# Patient Record
Sex: FEMALE | Race: WHITE | Employment: UNEMPLOYED | URBAN - METROPOLITAN AREA
[De-identification: names, ages, dates, MRNs, and addresses within clinical notes are randomized per-mention and may not be internally consistent; named-entity substitution may affect disease eponyms.]

---

## 2021-03-17 ENCOUNTER — HOSPITAL ENCOUNTER (EMERGENCY)
Age: 51
Discharge: HOME | End: 2021-03-17
Attending: EMERGENCY MEDICINE

## 2021-03-17 VITALS
DIASTOLIC BLOOD PRESSURE: 89 MMHG | TEMPERATURE: 99 F | SYSTOLIC BLOOD PRESSURE: 178 MMHG | BODY MASS INDEX: 31.58 KG/M2 | WEIGHT: 185 LBS | RESPIRATION RATE: 20 BRPM | HEIGHT: 64 IN | OXYGEN SATURATION: 99 % | HEART RATE: 95 BPM

## 2021-03-17 DIAGNOSIS — T78.3XXA ANGIOEDEMA, INITIAL ENCOUNTER: ICD-10-CM

## 2021-03-17 DIAGNOSIS — L50.9 URTICARIA: Primary | ICD-10-CM

## 2021-03-17 PROCEDURE — 96374 THER/PROPH/DIAG INJ IV PUSH: CPT

## 2021-03-17 PROCEDURE — 99284 EMERGENCY DEPT VISIT MOD MDM: CPT

## 2021-03-17 PROCEDURE — S0028 INJECTION, FAMOTIDINE, 20 MG: HCPCS | Performed by: PHYSICIAN ASSISTANT

## 2021-03-17 PROCEDURE — 96375 TX/PRO/DX INJ NEW DRUG ADDON: CPT

## 2021-03-17 RX ORDER — DIPHENHYDRAMINE HYDROCHLORIDE 50 MG/ML
50 INJECTION INTRAMUSCULAR; INTRAVENOUS ONCE
Status: COMPLETED | OUTPATIENT
Start: 2021-03-17 | End: 2021-03-17

## 2021-03-17 RX ORDER — PREDNISONE 20 MG/1
40 TABLET ORAL DAILY
Qty: 10 TABLET | Refills: 0 | Status: SHIPPED | OUTPATIENT
Start: 2021-03-17 | End: 2021-03-22

## 2021-03-17 RX ORDER — METHYLPREDNISOLONE SODIUM SUCCINATE 125 MG/2ML
125 INJECTION, POWDER, LYOPHILIZED, FOR SOLUTION INTRAMUSCULAR; INTRAVENOUS ONCE
Status: COMPLETED | OUTPATIENT
Start: 2021-03-17 | End: 2021-03-17

## 2021-03-17 RX ORDER — FAMOTIDINE 10 MG/ML
20 INJECTION, SOLUTION INTRAVENOUS ONCE
Status: COMPLETED | OUTPATIENT
Start: 2021-03-17 | End: 2021-03-17

## 2021-03-17 NOTE — ED PROVIDER NOTES
Patient Seen in: THE [facility] Emergency Department In [place]      History   Patient presents with:   Allergic Rxn Allergies    Stated Complaint: allergic rx chronic/cough    HPI/Subjective:   HPI    Raleigh Gabrielle is a 79-year-old female who presents today for evalua 99 °F (37.2 °C)   Temp src Temporal   SpO2 99 %   O2 Device None (Room air)       Current:BP (!) 178/89   Pulse 95   Temp 99 °F (37.2 °C) (Temporal)   Resp 20   Ht 162.6 cm (5' 4\")   Wt 83.9 kg   LMP 03/10/2021   SpO2 99%   BMI 31.76 kg/m²         [facility] plan of care. I explained to the patient that unfortunately, the emergency room is not able to work this kind of longstanding issue up thoroughly. She needs to see an allergist/immunologist, but is currently uninsured.   We recommend she contact the n

## 2023-08-28 ENCOUNTER — OFFICE VISIT (OUTPATIENT)
Dept: FAMILY MEDICINE CLINIC | Facility: CLINIC | Age: 53
End: 2023-08-28
Payer: COMMERCIAL

## 2023-08-28 VITALS
BODY MASS INDEX: 33.8 KG/M2 | HEIGHT: 64.25 IN | OXYGEN SATURATION: 98 % | HEART RATE: 89 BPM | RESPIRATION RATE: 16 BRPM | DIASTOLIC BLOOD PRESSURE: 80 MMHG | WEIGHT: 198 LBS | SYSTOLIC BLOOD PRESSURE: 108 MMHG

## 2023-08-28 DIAGNOSIS — Z12.83 SKIN CANCER SCREENING: ICD-10-CM

## 2023-08-28 DIAGNOSIS — Z13.21 ENCOUNTER FOR VITAMIN DEFICIENCY SCREENING: ICD-10-CM

## 2023-08-28 DIAGNOSIS — T78.40XA ALLERGY, INITIAL ENCOUNTER: Primary | ICD-10-CM

## 2023-08-28 DIAGNOSIS — Z12.31 ENCOUNTER FOR SCREENING MAMMOGRAM FOR BREAST CANCER: ICD-10-CM

## 2023-08-28 DIAGNOSIS — Z00.00 LABORATORY EXAMINATION ORDERED AS PART OF A ROUTINE GENERAL MEDICAL EXAMINATION: ICD-10-CM

## 2023-08-28 DIAGNOSIS — N92.6 IRREGULAR PERIODS: ICD-10-CM

## 2023-08-28 DIAGNOSIS — Z12.4 CERVICAL CANCER SCREENING: ICD-10-CM

## 2023-08-28 DIAGNOSIS — Z12.11 SCREENING FOR COLON CANCER: ICD-10-CM

## 2023-08-28 RX ORDER — OMALIZUMAB 150 MG/ML
300 INJECTION, SOLUTION SUBCUTANEOUS ONCE
COMMUNITY
Start: 2021-06-15

## 2023-08-28 RX ORDER — LORATADINE 10 MG/1
20 TABLET ORAL DAILY
COMMUNITY

## 2023-08-28 NOTE — PATIENT INSTRUCTIONS
Complete blood work   Follow up with allergist  Follow up with surgeon for colon cancer screening   Follow up with dermatologist   Follow up with gynecologist.   Complete mammogram.

## 2023-09-02 ENCOUNTER — LAB ENCOUNTER (OUTPATIENT)
Dept: LAB | Age: 53
End: 2023-09-02
Attending: FAMILY MEDICINE
Payer: COMMERCIAL

## 2023-09-02 DIAGNOSIS — Z00.00 LABORATORY EXAMINATION ORDERED AS PART OF A ROUTINE GENERAL MEDICAL EXAMINATION: ICD-10-CM

## 2023-09-02 DIAGNOSIS — N92.6 IRREGULAR PERIODS: ICD-10-CM

## 2023-09-02 DIAGNOSIS — Z13.21 ENCOUNTER FOR VITAMIN DEFICIENCY SCREENING: ICD-10-CM

## 2023-09-02 LAB
ALBUMIN SERPL-MCNC: 3.7 G/DL (ref 3.4–5)
ALBUMIN/GLOB SERPL: 1.2 {RATIO} (ref 1–2)
ALP LIVER SERPL-CCNC: 72 U/L
ALT SERPL-CCNC: 47 U/L
ANION GAP SERPL CALC-SCNC: 4 MMOL/L (ref 0–18)
AST SERPL-CCNC: 24 U/L (ref 15–37)
BASOPHILS # BLD AUTO: 0.04 X10(3) UL (ref 0–0.2)
BASOPHILS NFR BLD AUTO: 0.6 %
BILIRUB SERPL-MCNC: 0.5 MG/DL (ref 0.1–2)
BUN BLD-MCNC: 10 MG/DL (ref 7–18)
CALCIUM BLD-MCNC: 9.4 MG/DL (ref 8.5–10.1)
CHLORIDE SERPL-SCNC: 108 MMOL/L (ref 98–112)
CHOLEST SERPL-MCNC: 161 MG/DL (ref ?–200)
CO2 SERPL-SCNC: 28 MMOL/L (ref 21–32)
CREAT BLD-MCNC: 0.83 MG/DL
EGFRCR SERPLBLD CKD-EPI 2021: 84 ML/MIN/1.73M2 (ref 60–?)
EOSINOPHIL # BLD AUTO: 0.12 X10(3) UL (ref 0–0.7)
EOSINOPHIL NFR BLD AUTO: 1.7 %
ERYTHROCYTE [DISTWIDTH] IN BLOOD BY AUTOMATED COUNT: 12.3 %
FASTING PATIENT LIPID ANSWER: YES
FASTING STATUS PATIENT QL REPORTED: YES
FSH SERPL-ACNC: 38.9 MIU/ML
GLOBULIN PLAS-MCNC: 3 G/DL (ref 2.8–4.4)
GLUCOSE BLD-MCNC: 111 MG/DL (ref 70–99)
HCT VFR BLD AUTO: 40.3 %
HDLC SERPL-MCNC: 50 MG/DL (ref 40–59)
HGB BLD-MCNC: 13.6 G/DL
IMM GRANULOCYTES # BLD AUTO: 0.01 X10(3) UL (ref 0–1)
IMM GRANULOCYTES NFR BLD: 0.1 %
LDLC SERPL CALC-MCNC: 93 MG/DL (ref ?–100)
LH SERPL-ACNC: 29.9 MIU/ML
LYMPHOCYTES # BLD AUTO: 2.39 X10(3) UL (ref 1–4)
LYMPHOCYTES NFR BLD AUTO: 34.2 %
MCH RBC QN AUTO: 30.6 PG (ref 26–34)
MCHC RBC AUTO-ENTMCNC: 33.7 G/DL (ref 31–37)
MCV RBC AUTO: 90.8 FL
MONOCYTES # BLD AUTO: 0.41 X10(3) UL (ref 0.1–1)
MONOCYTES NFR BLD AUTO: 5.9 %
NEUTROPHILS # BLD AUTO: 4.02 X10 (3) UL (ref 1.5–7.7)
NEUTROPHILS # BLD AUTO: 4.02 X10(3) UL (ref 1.5–7.7)
NEUTROPHILS NFR BLD AUTO: 57.5 %
NONHDLC SERPL-MCNC: 111 MG/DL (ref ?–130)
OSMOLALITY SERPL CALC.SUM OF ELEC: 290 MOSM/KG (ref 275–295)
PLATELET # BLD AUTO: 292 10(3)UL (ref 150–450)
POTASSIUM SERPL-SCNC: 3.9 MMOL/L (ref 3.5–5.1)
PROT SERPL-MCNC: 6.7 G/DL (ref 6.4–8.2)
RBC # BLD AUTO: 4.44 X10(6)UL
SODIUM SERPL-SCNC: 140 MMOL/L (ref 136–145)
TRIGL SERPL-MCNC: 95 MG/DL (ref 30–149)
TSI SER-ACNC: 2.88 MIU/ML (ref 0.36–3.74)
VIT B12 SERPL-MCNC: 555 PG/ML (ref 193–986)
VIT D+METAB SERPL-MCNC: 46 NG/ML (ref 30–100)
VLDLC SERPL CALC-MCNC: 15 MG/DL (ref 0–30)
WBC # BLD AUTO: 7 X10(3) UL (ref 4–11)

## 2023-09-02 PROCEDURE — 80053 COMPREHEN METABOLIC PANEL: CPT

## 2023-09-02 PROCEDURE — 82607 VITAMIN B-12: CPT

## 2023-09-02 PROCEDURE — 80061 LIPID PANEL: CPT

## 2023-09-02 PROCEDURE — 84443 ASSAY THYROID STIM HORMONE: CPT

## 2023-09-02 PROCEDURE — 36415 COLL VENOUS BLD VENIPUNCTURE: CPT

## 2023-09-02 PROCEDURE — 82306 VITAMIN D 25 HYDROXY: CPT

## 2023-09-02 PROCEDURE — 85025 COMPLETE CBC W/AUTO DIFF WBC: CPT

## 2023-09-02 PROCEDURE — 83001 ASSAY OF GONADOTROPIN (FSH): CPT

## 2023-09-02 PROCEDURE — 83002 ASSAY OF GONADOTROPIN (LH): CPT

## 2023-09-08 DIAGNOSIS — R73.01 ELEVATED FASTING GLUCOSE: Primary | ICD-10-CM

## 2023-09-11 ENCOUNTER — TELEPHONE (OUTPATIENT)
Dept: ADMINISTRATIVE | Age: 53
End: 2023-09-11

## 2023-09-11 DIAGNOSIS — T78.40XA ALLERGY, INITIAL ENCOUNTER: Primary | ICD-10-CM

## 2023-09-12 ENCOUNTER — TELEPHONE (OUTPATIENT)
Dept: ALLERGY | Facility: CLINIC | Age: 53
End: 2023-09-12

## 2023-09-12 ENCOUNTER — OFFICE VISIT (OUTPATIENT)
Facility: LOCATION | Age: 53
End: 2023-09-12
Payer: COMMERCIAL

## 2023-09-12 DIAGNOSIS — Z12.11 ENCOUNTER FOR SCREENING COLONOSCOPY: Primary | ICD-10-CM

## 2023-09-12 PROCEDURE — S0285 CNSLT BEFORE SCREEN COLONOSC: HCPCS | Performed by: SURGERY

## 2023-09-12 RX ORDER — POLYETHYLENE GLYCOL 3350, SODIUM CHLORIDE, SODIUM BICARBONATE, POTASSIUM CHLORIDE 420; 11.2; 5.72; 1.48 G/4L; G/4L; G/4L; G/4L
POWDER, FOR SOLUTION ORAL
Qty: 1 EACH | Refills: 0 | Status: SHIPPED | OUTPATIENT
Start: 2023-09-12

## 2023-09-14 ENCOUNTER — NURSE ONLY (OUTPATIENT)
Dept: ALLERGY | Facility: CLINIC | Age: 53
End: 2023-09-14

## 2023-09-14 ENCOUNTER — TELEPHONE (OUTPATIENT)
Dept: ALLERGY | Facility: CLINIC | Age: 53
End: 2023-09-14

## 2023-09-14 ENCOUNTER — OFFICE VISIT (OUTPATIENT)
Dept: ALLERGY | Facility: CLINIC | Age: 53
End: 2023-09-14
Payer: COMMERCIAL

## 2023-09-14 VITALS
OXYGEN SATURATION: 97 % | SYSTOLIC BLOOD PRESSURE: 133 MMHG | HEART RATE: 73 BPM | BODY MASS INDEX: 32.44 KG/M2 | WEIGHT: 190 LBS | HEIGHT: 64 IN | DIASTOLIC BLOOD PRESSURE: 91 MMHG

## 2023-09-14 DIAGNOSIS — L50.1 CHRONIC IDIOPATHIC URTICARIA: Primary | ICD-10-CM

## 2023-09-14 DIAGNOSIS — H10.10 SEASONAL AND PERENNIAL ALLERGIC RHINOCONJUNCTIVITIS: ICD-10-CM

## 2023-09-14 DIAGNOSIS — J30.2 SEASONAL AND PERENNIAL ALLERGIC RHINOCONJUNCTIVITIS: ICD-10-CM

## 2023-09-14 DIAGNOSIS — L50.1 IDIOPATHIC URTICARIA: Primary | ICD-10-CM

## 2023-09-14 DIAGNOSIS — J30.89 SEASONAL AND PERENNIAL ALLERGIC RHINOCONJUNCTIVITIS: ICD-10-CM

## 2023-09-14 DIAGNOSIS — Z28.21 COVID-19 VACCINE DOSE DECLINED: ICD-10-CM

## 2023-09-14 DIAGNOSIS — T78.3XXA ANGIOEDEMA, INITIAL ENCOUNTER: ICD-10-CM

## 2023-09-14 PROCEDURE — 3075F SYST BP GE 130 - 139MM HG: CPT | Performed by: ALLERGY & IMMUNOLOGY

## 2023-09-14 PROCEDURE — 3008F BODY MASS INDEX DOCD: CPT | Performed by: ALLERGY & IMMUNOLOGY

## 2023-09-14 PROCEDURE — 3080F DIAST BP >= 90 MM HG: CPT | Performed by: ALLERGY & IMMUNOLOGY

## 2023-09-14 PROCEDURE — 99244 OFF/OP CNSLTJ NEW/EST MOD 40: CPT | Performed by: ALLERGY & IMMUNOLOGY

## 2023-09-14 PROCEDURE — 96372 THER/PROPH/DIAG INJ SC/IM: CPT | Performed by: ALLERGY & IMMUNOLOGY

## 2023-09-14 RX ORDER — EPINEPHRINE 0.3 MG/.3ML
INJECTION SUBCUTANEOUS
Qty: 1 EACH | Refills: 0 | Status: SHIPPED | OUTPATIENT
Start: 2023-09-14

## 2023-09-14 NOTE — TELEPHONE ENCOUNTER
Patient during office visit completed patient portion of Xolair Prescriber Services Form and Patient Consent Form. RN completed clinical portion of form. Dr. Martha Law reviewed forms, completed physician portion of form and signed form. Completed Prescriber Service Form and Patient Consent faxed to [address] [place] [address] at [phone]. Fax confirmation received. Await response from [employer].

## 2023-09-20 NOTE — TELEPHONE ENCOUNTER
Xolair PA form printed from [employer]. Completed Xolair PA form. Completed form with copy of 9/14/2023 Allergy Physician Visit Note faxed to [facility] at [phone]. Await fax confirmation.

## 2023-09-20 NOTE — TELEPHONE ENCOUNTER
Xolair Access Solution form completed reporting that Xolair coverage will be initiated through pharmacy benefit- [employer], and that covered specialty pharmacy is [facility]. [employer] covered specialty pharmacy for Xolair [facility] per protocol. Completed form faxed to [address] [place], [place] [address] [facility] at [phone]. Fax confirmation received.

## 2023-09-20 NOTE — TELEPHONE ENCOUNTER
[employer] Benefit Investigation Results received via fax. Complete PA through . . . Pharmacy Benefit- [employer]    Call [phone] for PA and to inquire of covered specialty pharmacy for [address] is not required. Pre-Determination is Highly Recommended. Call for Juan Diego Grimm, [facility], [phone]. Covered specialty pharmacy is [address]

## 2023-09-21 RX ORDER — OMALIZUMAB 150 MG/ML
300 INJECTION, SOLUTION SUBCUTANEOUS
Qty: 2 ML | Refills: 11 | Status: SHIPPED | OUTPATIENT
Start: 2023-09-21

## 2023-09-21 NOTE — TELEPHONE ENCOUNTER
Xolair prescription sent.   Patient has been is on Xolair in the past.  May follow-up in 3 months/after first 3 doses

## 2023-09-21 NOTE — TELEPHONE ENCOUNTER
Fax received from [facility] Manzanares. The contracted specialty pharmacy is  [facility]: Phone [phone]  We have referred your patient to the Specialty Pharmacy  Prior authorization may be required  The specialty pharmacy is verifying benefits.

## 2023-09-21 NOTE — TELEPHONE ENCOUNTER
Fax received from [address] [place] [address],[address] prescription drug approval for Xolair 150 mg PFS    Case certification number: [id number]  Granted: 09/21/2023-09/21/2024    Approval placed on biologic chart, and also sent to scan into this encounter. Rx for Xolair sent to [address] [place] [address]. Dr. Argentina Oro please review and sign. Dr. Rakel Perry received a sample in our office on 9/14/2023 to restart Xolair. When would you like patient to return to see you for office visit?

## 2023-09-21 NOTE — TELEPHONE ENCOUNTER
Reported the Prior authorization approval to [address] [place] [address]. Spoke to [employer], patient service rep. Process 5-8 business days for the Rx to be processed and approved since she is a new patient before we can schedule medication delivery. Spoke to patient to update her on the specialty pharmacy. She verbalizes her understanding.

## 2023-09-26 NOTE — TELEPHONE ENCOUNTER
See [employer] message sent to patient. Pamela Hanson,     I have good news. The prior authorization for Xolair was approved by your insurance. Your covered specialty pharmacy is [facility] (telephone number [phone]). A Xolair prescription has been sent to your specialty pharmacy. You should have been contacted by [employer] with Xolair copay assistance information. If you have not received any communication with [employer], please call [phone] and obtain copay assistance information for Xolair. Then, please call the specialty pharmacy and give the copay assistance information and wade physician's office permission to schedule delivery of Xolair to physician's office. An RN will then schedule delivery of Xolair to physician's office. Once the delivery of Xolair is received, your first Xolair injection visit  with the nurse and 3 month follow-up appointment Dr. Papa Eastman can be scheduled. Please be aware there is a 2 hour observation period in office post first Xolair injection. Subsequent Xolair injections require a 30 min observation period.       Brooksie Ahumada RN

## 2023-09-26 NOTE — TELEPHONE ENCOUNTER
Form received via fax from [facility] [address] [place] [address] asking for strength of Xolair Rx below . . .    Form completed with below Rx. Disp Refills Start End    Omalizumab Terrance Deshpande) 150 MG/ML Subcutaneous Solution Prefilled Syringe 2 mL 11 9/21/2023     Sig - Route: Inject 300 mg into the skin every 28 days. - Subcutaneous    Sent to pharmacy as: Xolair 150 MG/ML Subcutaneous Solution Prefilled Syringe (Omalizumab)    Notes to Pharmacy: Case certification number: [id number] Granted: 09/21/2023-09/21/2024    E-Prescribing Status: Receipt confirmed by pharmacy (9/21/2023 11:44 AM CDT)      Associated Diagnoses    Chronic idiopathic urticaria  - Primary        Pharmacy    [address] [place] [address] [place]  [phone], [phone]       Dr. Robyn Hope signed form and it was faxed to [facility] [address] [place] [address] at [phone]. Await fax confirmation.

## 2023-09-28 NOTE — TELEPHONE ENCOUNTER
[facility] contacted at [phone]. RN spoke with pharmacy service rep, Trino Lopez. Rep offers that patient's Xolair is ready to schedule for delivery, but patient has not yet given permission to ship the medication. Attempted to reach patient via telephone. Message left on voicemail with same info sent via [employer] below. Marisabel Hernandez,      I attempted to schedule delivery of [facility] to the [facility]'s [facility] The pharmacy  with [facility] reported that [facility] requires your permission for our office to schedule delivery of the medication. Please call [facility] at [phone].         Thank you for your assistance,      Lluvia Rocha, DINO

## 2023-10-02 RX ORDER — OMALIZUMAB 150 MG/ML
300 INJECTION, SOLUTION SUBCUTANEOUS
Qty: 2 ML | Refills: 5 | Status: SHIPPED | OUTPATIENT
Start: 2023-10-02

## 2023-10-02 NOTE — TELEPHONE ENCOUNTER
See [employer] message sent to patient . . . Alec Breaux,      I cancelled the prescription for Xolair that [facility] was processing. A new prescription for Xolair for a one month supply that will be dispensed only monthly, not a multiple month supply, was sent to [facility]. [facility] will process the new prescription. Please call [facility] at [phone] in 2-3 days to reprocess your Xolair copayment assistance.         Aristeo Nair., DINO

## 2023-10-06 NOTE — TELEPHONE ENCOUNTER
[facility] contacted at [phone]. RN spoke with pharmacy service rep, Toby Chapman. Rep offers that patient's coverage for Xolair has not yet been verified. Not able to schedule Xolair delivery at this time. Rep sent an urgent message to the [employer]ification Team to process the coverage. Will call pharmacy next week to schedule delivery of Xolair.

## 2023-10-10 NOTE — TELEPHONE ENCOUNTER
Spoke to representative at [facility]. In progress for prescription verification. In last step with the pharmacist.   Jacy Khanna will likely be ready for scheduling delivery.

## 2023-10-11 NOTE — TELEPHONE ENCOUNTER
Spoke to SOFIA Stafford with [facility]. Medication ready to be scheduled. Shipping authorization is not on file. [facility] pharmacy reached out to pharmacy. They left a voicemail. Phone number for patient to call [phone]    [employer] message sent to patient to contact [facility].

## 2023-10-13 NOTE — TELEPHONE ENCOUNTER
Patient calling for update on rx Xolair and asking about help with assistance with payment for medication that is $1500 per month to receive. Patient asking help through [address] located in [place]. Please call at [phone],thanks.

## 2023-10-13 NOTE — TELEPHONE ENCOUNTER
RN called [facility] to see if delivery of Xolair could be set up. Spoke with rep named Malissa Pinto. She reports pt has not yet provided consent for Xolair delivery to our office. Malissa Pinto called the patient to see if she could obtain consent. She reports that pt did not want to provide consent to ship yet as she has a very high co-pay for Xolair. Pt reports that she is working with someone from [facility] to enroll in the copay assistance program.     When pt has copay assistance information she will contact [facility] and then provide consent to ship.

## 2023-10-17 NOTE — TELEPHONE ENCOUNTER
Patient spoken with over the telephone. Patient states that she did not originally report the [address] to [address] as she was under the impression that [employer] was supplying the copayment assistance, not [employer]. Patient informed that [employer] is a program provided by [employer]. Patient offers that this confusion delayed her receiving Xolair by 1.5 weeks. Nurse apologized for any confusion or delay. Patient scheduled second Xolair injection appointment for 10/30/2023. First Xolair injection was given 9/14/2023 at original consult appt with Dr. Cheryle Ripple. Patient waited for 30 min post 9/14/2023 injection as she had previously taken Xolair through another Allergist.     Patient states that she would like to schedule Xolair out to every 8 weeks, instead of taking every 4 weeks. Patient informed Dr. Cheryle Ripple will address and nurse will call back with his advice regarding tapering our to Xolair dosing. Please advice on post-injection waiting period for 10/30/2023 Xolair injection.

## 2023-10-17 NOTE — TELEPHONE ENCOUNTER
Call noted.   Xolair observation will be 30 minutes after receiving Xolair  May try dosing at every 8-week intervals  If hives worsen on Xolair every 8 weeks can decrease frequency to every 4 weeks

## 2023-10-17 NOTE — TELEPHONE ENCOUNTER
[facility] contacted at [phone]. RN spoke with pharmacy , Moustapha Velásquez. Xolair 150 mg/mL prefilled syringe x2 scheduled by patient to arrive at physician's office 10/19/2023.

## 2023-10-18 NOTE — TELEPHONE ENCOUNTER
LM on patient's voicemail asking informing her that Dr. Pillai Hallmark further advice will be sent to her via [address] Patient asked to respond via [employer] or call the Allergy Office with any questions or concerns. Renetta Monroe,     Dr. Rashmi Hobbs reports that you may try dosing Xolair to every 8 week injections if you would like. He states that if hives worsen on Xolair every 8 weeks, he will decrease the frequency to every 4 week injection intervals.          Itzel Dubois., RN

## 2023-10-30 ENCOUNTER — NURSE ONLY (OUTPATIENT)
Dept: ALLERGY | Facility: CLINIC | Age: 53
End: 2023-10-30

## 2023-10-30 VITALS
DIASTOLIC BLOOD PRESSURE: 83 MMHG | OXYGEN SATURATION: 99 % | RESPIRATION RATE: 18 BRPM | HEART RATE: 70 BPM | SYSTOLIC BLOOD PRESSURE: 143 MMHG

## 2023-10-30 DIAGNOSIS — L50.1 CHRONIC IDIOPATHIC URTICARIA: Primary | ICD-10-CM

## 2023-10-30 NOTE — PROGRESS NOTES
Xolair Progress Note:     Pre-treatment Peak Flow: NA  Post treatment Peak Flow: NA  See \"vitals\" flow sheet for vital sign detail. See MAR for injection detail. Per standing order pt to continue Xolair injections every 8  weeks 300 mg. PT verified name, , and injection to be given. Xolair 150 mg SC given upper right and left arms at 1053. Xolair LOT#: [id number]  Xolair EXP:  2024     Pt signed Specialty pharmacy shipping order form? NA    Patient Status: Patient d/c'd home 30 minutes s/p Xolair injections for idiopathic urticaria. Patient  tolerates injection without complications. Site WNL. No further questions or concerns at this time. Return to clinic for next injection as advised by Dr. Benjamin Fuentes.      Time d/c to home: 1123  Next appointment scheduled for:

## 2023-11-14 ENCOUNTER — TELEPHONE (OUTPATIENT)
Dept: ALLERGY | Facility: CLINIC | Age: 53
End: 2023-11-14

## 2023-11-14 NOTE — TELEPHONE ENCOUNTER
[facility] contacted at [phone]. RN Spoke with Pharmacy , Tiffany Thomas. Xolair 150 mg/mL prefilled syringe x2 scheduled for delivery: 11/16/2023. Patient's next Xolair Injection Appt:  12/18/2023.

## 2023-12-12 ENCOUNTER — TELEPHONE (OUTPATIENT)
Dept: ALLERGY | Facility: CLINIC | Age: 53
End: 2023-12-12

## 2023-12-12 NOTE — TELEPHONE ENCOUNTER
[facility] called at [phone]. RN spoke with pharmacy service rep, Gage Askew. Xolair 150 mg/mL prefilled syringe x2 scheduled for delivery: 12/14/2023. Patient next Xolair injection appt: 12/18/2023.

## 2023-12-18 ENCOUNTER — NURSE ONLY (OUTPATIENT)
Dept: ALLERGY | Facility: CLINIC | Age: 53
End: 2023-12-18
Payer: COMMERCIAL

## 2023-12-18 VITALS
OXYGEN SATURATION: 98 % | HEART RATE: 76 BPM | SYSTOLIC BLOOD PRESSURE: 120 MMHG | RESPIRATION RATE: 18 BRPM | DIASTOLIC BLOOD PRESSURE: 83 MMHG

## 2023-12-18 DIAGNOSIS — L50.1 CHRONIC IDIOPATHIC URTICARIA: Primary | ICD-10-CM

## 2023-12-18 NOTE — PROGRESS NOTES
Xolair Progress Note:     Pre-treatment Peak Flow: NA  Post treatment Peak Flow: NA  See \"vitals\" flow sheet for vital sign detail. See MAR for injection detail. Per standing order pt to continue Xolair injections every 8  weeks 300 mg. PT verified name, , and injection to be given. Xolair 150 mg SC given upper right and left arms at 1055. LOT#: [id number]  EXP: OCT 2024      Patient Status: Patient d/c'd home 30 minutes s/p Xolair injections for idiopathic urticaria. Patient  tolerates injection without complications. Site WNL. No further questions or concerns at this time. Return to clinic for next injection as advised by Dr. Federico Beebe.      Time d/c to home: 1025  Next appointment scheduled for: 2024

## 2024-02-06 ENCOUNTER — TELEPHONE (OUTPATIENT)
Dept: ALLERGY | Facility: CLINIC | Age: 54
End: 2024-02-06

## 2024-02-06 ENCOUNTER — NURSE ONLY (OUTPATIENT)
Dept: ALLERGY | Facility: CLINIC | Age: 54
End: 2024-02-06
Payer: COMMERCIAL

## 2024-02-06 VITALS — DIASTOLIC BLOOD PRESSURE: 76 MMHG | HEART RATE: 88 BPM | OXYGEN SATURATION: 99 % | SYSTOLIC BLOOD PRESSURE: 152 MMHG

## 2024-02-06 DIAGNOSIS — L50.1 CHRONIC IDIOPATHIC URTICARIA: Primary | ICD-10-CM

## 2024-02-06 PROCEDURE — 3077F SYST BP >= 140 MM HG: CPT | Performed by: ALLERGY & IMMUNOLOGY

## 2024-02-06 PROCEDURE — 96372 THER/PROPH/DIAG INJ SC/IM: CPT | Performed by: ALLERGY & IMMUNOLOGY

## 2024-02-06 PROCEDURE — 3078F DIAST BP <80 MM HG: CPT | Performed by: ALLERGY & IMMUNOLOGY

## 2024-02-06 RX ORDER — FEXOFENADINE HCL 180 MG/1
180 TABLET ORAL DAILY
COMMUNITY

## 2024-02-06 NOTE — PROGRESS NOTES
Xolair Progress Note:     See \"vitals\" flow sheet for vital sign detail.   See MAR for injection detail.     Per standing order pt to continue Xolair injections every 8  weeks 300 mg.   PT verified name, , and injection to be given. Xolair 150 mg SC given upper right and left arms at 1055.    Xolair Exp: 10/2024  Xolair Lot #[id number]      Patient Status: Patient d/c'd home 30 minutes s/p Xolair injections for idiopathic urticaria. Patient  tolerates injection without complications. Site WNL. No further questions or concerns at this time.  Return to clinic for next injection as advised by Dr. Thomas.     Time d/c to home 1125  Next appointment scheduled for: 2024

## 2024-02-12 RX ORDER — FLUTICASONE PROPIONATE 50 MCG
SPRAY, SUSPENSION (ML) NASAL DAILY
COMMUNITY

## 2024-03-07 ENCOUNTER — ANESTHESIA EVENT (OUTPATIENT)
Dept: ENDOSCOPY | Facility: HOSPITAL | Age: 54
End: 2024-03-07
Payer: COMMERCIAL

## 2024-03-07 ENCOUNTER — HOSPITAL ENCOUNTER (OUTPATIENT)
Facility: HOSPITAL | Age: 54
Setting detail: HOSPITAL OUTPATIENT SURGERY
Discharge: HOME | End: 2024-03-07
Attending: SURGERY | Admitting: SURGERY
Payer: COMMERCIAL

## 2024-03-07 ENCOUNTER — ANESTHESIA (OUTPATIENT)
Dept: ENDOSCOPY | Facility: HOSPITAL | Age: 54
End: 2024-03-07
Payer: COMMERCIAL

## 2024-03-07 VITALS
SYSTOLIC BLOOD PRESSURE: 127 MMHG | TEMPERATURE: 98 F | DIASTOLIC BLOOD PRESSURE: 60 MMHG | HEIGHT: 64 IN | BODY MASS INDEX: 31.58 KG/M2 | RESPIRATION RATE: 18 BRPM | HEART RATE: 69 BPM | OXYGEN SATURATION: 99 % | WEIGHT: 185 LBS

## 2024-03-07 DIAGNOSIS — Z12.11 ENCOUNTER FOR SCREENING COLONOSCOPY: ICD-10-CM

## 2024-03-07 LAB — B-HCG UR QL: NEGATIVE

## 2024-03-07 PROCEDURE — 81025 URINE PREGNANCY TEST: CPT

## 2024-03-07 PROCEDURE — 0DBL8ZZ EXCISION OF TRANSVERSE COLON, VIA NATURAL OR ARTIFICIAL OPENING ENDOSCOPIC: ICD-10-PCS | Performed by: SURGERY

## 2024-03-07 PROCEDURE — 88305 TISSUE EXAM BY PATHOLOGIST: CPT | Performed by: SURGERY

## 2024-03-07 RX ORDER — SODIUM CHLORIDE, SODIUM LACTATE, POTASSIUM CHLORIDE, CALCIUM CHLORIDE 600; 310; 30; 20 MG/100ML; MG/100ML; MG/100ML; MG/100ML
INJECTION, SOLUTION INTRAVENOUS CONTINUOUS
Status: DISCONTINUED | OUTPATIENT
Start: 2024-03-07 | End: 2024-03-07

## 2024-03-07 RX ORDER — NALOXONE HYDROCHLORIDE 0.4 MG/ML
0.08 INJECTION, SOLUTION INTRAMUSCULAR; INTRAVENOUS; SUBCUTANEOUS ONCE AS NEEDED
Status: DISCONTINUED | OUTPATIENT
Start: 2024-03-07 | End: 2024-03-07

## 2024-03-07 RX ADMIN — SODIUM CHLORIDE, SODIUM LACTATE, POTASSIUM CHLORIDE, CALCIUM CHLORIDE: 600; 310; 30; 20 INJECTION, SOLUTION INTRAVENOUS at 08:08:00

## 2024-03-07 NOTE — ANESTHESIA POSTPROCEDURE EVALUATION
[facility]    Luzmaria Stacy Patient Status:  Hospital Outpatient Surgery   Age/Gender 53 year old female MRN [id number]   Location [facility] Attending Horacio Bingham DO   Hosp Day # 0 PCP Ghassan Michel MD       Anesthesia Post-op Note    COLONOSCOPY with forcep polypectomy    Procedure Summary       Date: 03/07/24 Room / Location:  ENDOSCOPY 04 /  ENDOSCOPY    Anesthesia Start: 0808 Anesthesia Stop: 0838    Procedure: COLONOSCOPY with forcep polypectomy Diagnosis:       Encounter for screening colonoscopy      (Polyp, Diverticulosis)    Surgeons: Horacio Bingham DO Anesthesiologist: Wilfred Mcnally MD    Anesthesia Type: MAC ASA Status: 2            Anesthesia Type: MAC    Vitals Value Taken Time   /65 03/07/24 0839   Temp  03/07/24 0839   Pulse 69 03/07/24 0839   Resp 18 03/07/24 0839   SpO2 100 03/07/24 0839       Patient Location: Endoscopy    Anesthesia Type: MAC    Airway Patency: patent    Postop Pain Control: adequate    Mental Status: preanesthetic baseline    Nausea/Vomiting: none    Cardiopulmonary/Hydration status: stable euvolemic    Complications: no apparent anesthesia related complications    Postop vital signs: stable    Dental Exam: Unchanged from Preop    Patient to be discharged home when criteria met.

## 2024-03-07 NOTE — H&P
Luzmaria is a 53 year old woman coming in for a first time screening colonoscopy. She currently has no health complaints, and says that her bowel movements are regular and normal in consistency. Regarding family history, her sister has a history of colon polyps. She denies any blood in her stool, abnormal weight loss, or abdominal pain. She is taking no blood thinners.                       Past Medical History:   Diagnosis Date    Allergic rhinitis 1996     Ragweed    Arthritis Last couple yrs     Mild    Essential hypertension Sometimes            Past Surgical History:   Procedure Laterality Date       91,96,99    TUBAL LIGATION   Tubes caderized in     TUBES          Omalizumab (XOLAIR) 150 MG/ML Subcutaneous Solution Prefilled Syringe, Inject 300 mg into the skin once., Disp: , Rfl:   loratadine 10 MG Oral Tab, Take 2 tablets (20 mg total) by mouth daily., Disp: , Rfl:   diphenhydrAMINE HCl (BENADRYL ALLERGY OR), Take 2 tablets by mouth daily., Disp: , Rfl:      No current facility-administered medications on file prior to visit.     @ALL@        Family History   Problem Relation Age of Onset    Cancer Mother      Hypertension Mother      Breast Cancer Mother      Asthma Father      Skin cancer Sister        Social History            Socioeconomic History    Marital status: Single   Tobacco Use    Smoking status: Never    Smokeless tobacco: Never   Vaping Use    Vaping Use: Never used   Substance and Sexual Activity    Alcohol use: Yes       Comment: On average a few drinks a week    Drug use: Not Currently       Types: Cannabis   Other Topics Concern    Caffeine Concern No    Exercise Yes    Seat Belt Yes    Special Diet No    Stress Concern No    Weight Concern No         ROS      GENERAL HEALTH: otherwise feels well, no weight loss, no fever or chills  SKIN: denies any unusual skin rashes or jaundice  HEENT: denies nasal congestion, sinus pain or sore throat; hearing loss negative,   EYES , no  diplopia or vision changes  RESPIRATORY: denies shortness of breath, wheezing or cough   CARDIOVASCULAR: denies chest pain or ALVAREZ; no palpitations   GI: denies nausea, vomiting, constipation, diarrhea; no rectal bleeding; no heartburn  GENITAL/: no dysuria, urgency or frequency, no tea colored urine  MUSCULOSKELETAL: no joint complaints upper or lower extremities  HEMATOLOGY: denies hx anemia; denies bruising or excessive bleeding  Endocrine: no weight gain or loss no hot or cold intolerance     EXAM      Last menstrual period 07/15/2023.  GENERAL: well developed, well nourished female, in no apparent distress.    MENTAL STATUS :Alert, oriented x 3  PSYCH: normal mood and affect  SKIN: anicteric, no rashes, no bruising  EYES: PERRLA, EOMI, sclera anicteric,  conjunctiva without pallor  HEENT: normocephalic, atraumatic, TMs clear, nares patent, mouth moist, pharynx w/o erythema  NECK: supple, no JVD, no adenopathy, no thyromegaly  RESPIRATORY: clear to auscultation, no rales , rhonchi or wheezing  CARDIOVASCULAR: RRR, murmur negative  ABDOMEN: normal active BS, soft, nondistended  no HSM, no masses or hernias  LYMPHATIC: no axillary , supraclavicular or inguinal lymphadenopathy  EXTREMITIES: no cyanosis, clubbing or edema, no atrophy, full ROM     STUDIES:              [facility] Lab Encounter on 09/02/2023   Component Date Value Ref Range Status    Glucose 09/02/2023 111 (H)  70 - 99 mg/dL Final    Sodium 09/02/2023 140  136 - 145 mmol/L Final    Potassium 09/02/2023 3.9  3.5 - 5.1 mmol/L Final    Chloride 09/02/2023 108  98 - 112 mmol/L Final    CO2 09/02/2023 28.0  21.0 - 32.0 mmol/L Final    Anion Gap 09/02/2023 4  0 - 18 mmol/L Final    BUN 09/02/2023 10  7 - 18 mg/dL Final    Creatinine 09/02/2023 0.83  0.55 - 1.02 mg/dL Final    Calcium, Total 09/02/2023 9.4  8.5 - 10.1 mg/dL Final    Calculated Osmolality 09/02/2023 290  275 - 295 mOsm/kg Final    eGFR-Cr 09/02/2023 84  >=60 mL/min/1.73m2 Final    AST 09/02/2023  24  15 - 37 U/L Final    ALT 09/02/2023 47  13 - 56 U/L Final    Alkaline Phosphatase 09/02/2023 72  41 - 108 U/L Final    Bilirubin, Total 09/02/2023 0.5  0.1 - 2.0 mg/dL Final    Total Protein 09/02/2023 6.7  6.4 - 8.2 g/dL Final    Albumin 09/02/2023 3.7  3.4 - 5.0 g/dL Final    Globulin  09/02/2023 3.0  2.8 - 4.4 g/dL Final    A/G Ratio 09/02/2023 1.2  1.0 - 2.0 Final    Patient Fasting for CMP? 09/02/2023 Yes    Final    Cholesterol, Total 09/02/2023 161  <200 mg/dL Final     Desirable  <200 mg/dL  Borderline  200-239 mg/dL  High      >=240 mg/dL          HDL Cholesterol 09/02/2023 50  40 - 59 mg/dL Final     Interpretive Information:   An HDL cholesterol <40 mg/dL is low and constitutes a coronary heart disease risk factor. An HDL cholesterol >60 mg/dL is a negative risk factor for coronary heart disease.          Triglycerides 09/02/2023 95  30 - 149 mg/dL Final     Reference interval for fasting triglycerides  Desirable: <150 mg/dL  Borderline: 150-199 mg/dL  High: 200-499 mg/dL  Very High: >=500 mg/dL             LDL Cholesterol 09/02/2023 93  <100 mg/dL Final     Desirable <100 mg/dL   Borderline 100-129 mg/dL   High     >=130mg/dL          VLDL 09/02/2023 15  0 - 30 mg/dL Final    Non HDL Chol 09/02/2023 111  <130 mg/dL Final     Desirable  <130 mg/dL   Borderline  130-159 mg/dL   High        160-189 mg/dL       Very high >=190 mg/dL          Patient Fasting for Lipid? 09/02/2023 Yes    Final    TSH 09/02/2023 2.880  0.358 - 3.740 mIU/mL Final     This test may exhibit interference when a sample is collected from a person who is consuming high dose of biotin (a.k.a., vitamin B7, vitamin H, coenzyme R) supplements resulting in serum concentrations >100 ng/mL.  Intake of the recommended daily allowance (RDA) for biotin (0.03 mg) has not been shown to typically cause significant interference; however, high dose daily dietary supplements may contain biotin concentrations greater than 150 times (5-10 mg) the  RDA.  It is recommended that physicians ask all patients who may be on biotin supplementation to stop biotin consumption at least 72 hours prior to collection of a new sample.       Vitamin B12 09/02/2023 555  193 - 986 pg/mL Final     Vitamin B12 Reference Range      Deficient:      <150 pg/mL      Indeterminate   150-192 pg/mL      Normal:         193-986 pg/mL        This test may exhibit interference when a sample is collected from a person who is consuming high dose of biotin (a.k.a., vitamin B7, vitamin H, coenzyme R) supplements resulting in serum concentrations >100 ng/mL.  Intake of the recommended daily allowance (RDA) for biotin (0.03 mg) has not been shown to typically cause significant interference; however, high dose daily dietary supplements may contain biotin concentrations greater than 150 times (5-10 mg) the RDA.  It is recommended that physicians ask all patients who may be on biotin supplementation to stop biotin consumption at least 72 hours prior to collection of a new sample.       WBC 09/02/2023 7.0  4.0 - 11.0 x10(3) uL Final    RBC 09/02/2023 4.44  3.80 - 5.30 x10(6)uL Final    HGB 09/02/2023 13.6  12.0 - 16.0 g/dL Final    HCT 09/02/2023 40.3  35.0 - 48.0 % Final    PLT 09/02/2023 292.0  150.0 - 450.0 10(3)uL Final    MCV 09/02/2023 90.8  80.0 - 100.0 fL Final    MCH 09/02/2023 30.6  26.0 - 34.0 pg Final    MCHC 09/02/2023 33.7  31.0 - 37.0 g/dL Final    RDW 09/02/2023 12.3  % Final    Neutrophil Absolute Prelim 09/02/2023 4.02  1.50 - 7.70 x10 (3) uL Final    Neutrophil Absolute 09/02/2023 4.02  1.50 - 7.70 x10(3) uL Final    Lymphocyte Absolute 09/02/2023 2.39  1.00 - 4.00 x10(3) uL Final    Monocyte Absolute 09/02/2023 0.41  0.10 - 1.00 x10(3) uL Final    Eosinophil Absolute 09/02/2023 0.12  0.00 - 0.70 x10(3) uL Final    Basophil Absolute 09/02/2023 0.04  0.00 - 0.20 x10(3) uL Final    Immature Granulocyte Absolute 09/02/2023 0.01  0.00 - 1.00 x10(3) uL Final    Neutrophil % 09/02/2023  57.5  % Final    Lymphocyte % 09/02/2023 34.2  % Final    Monocyte % 09/02/2023 5.9  % Final    Eosinophil % 09/02/2023 1.7  % Final    Basophil % 09/02/2023 0.6  % Final    Immature Granulocyte % 09/02/2023 0.1  % Final    Vitamin D, 25OH, Total 09/02/2023 46.0  30.0 - 100.0 ng/mL Final     Literature Recommendations for 25(OH)D levels are:  Range           Vitamin D Status   <20    ng/mL      Deficiency   20-<30 ng/mL      Insufficiency    ng/mL      Sufficiency   >100   ng/mL      Toxicity     *Clinical controversy exists regarding optimal 25(OH)D levels. Emerging evidence links potential adverse effects to high levels, particularly >60 ng/mL.          FSH 09/02/2023 38.9  No established range for female sex mIU/mL Final     Follicular           2.3 - 12.6 mIU/mL  Midcycle             5.2 - 17.5 mIU/mL  Luteal               1.7 - 12.9  mIU/mL  Postmenopausal on Hormone Therapy      5.9 - 72.8   mIU/mL  Postmenopausal not on Hormone Therapy  12.7 - 132.2 mIU/mL  This test may exhibit interference when a sample is collected from a person who is consuming high dose of biotin (a.k.a., vitamin B7, vitamin H, coenzyme R) supplements resulting in serum concentrations >100 ng/mL.  Intake of the recommended daily allowance (RDA) for biotin (0.03 mg) has not been shown to typically cause significant interference; however, high dose daily dietary supplements may contain biotin concentrations greater than 150 times (5-10 mg) the RDA.  It is recommended that physicians ask all patients who may be on biotin supplementation to stop biotin consumption at least 72 hours prior to collection of a new sample.       LH 09/02/2023 29.9  No established range for female sex mIU/mL Final     Follicular     1.9 - 12.8  mIU/mL   Midcycle       22.8 - 76.1 mIU/mL   Luteal         0.6 - 13.5  mIU/mL   Postmenopausal on Hormone Therapy     1.1 - 52.4 mIU/mL  Postmenopausal not on Hormone Therapy 8.6 - 61.8 mIU/mL  This test may exhibit  interference when a sample is collected from a person who is consuming high dose of biotin (a.k.a., vitamin B7, vitamin H, coenzyme R) supplements resulting in serum concentrations >100 ng/mL.  Intake of the recommended daily allowance (RDA) for biotin (0.03 mg) has not been shown to typically cause significant interference; however, high dose daily dietary supplements may contain biotin concentrations greater than 150 times (5-10 mg) the RDA.  It is recommended that physicians ask all patients who may be on biotin supplementation to stop biotin consumption at least 72 hours prior to collection of a new sample.            ASSESSMENT   Imp: High risk screening colonoscopy  PLan: Colonoscopy discussed with patient risk of bleeding and bowel perforation with surgery to repair

## 2024-03-07 NOTE — ANESTHESIA PREPROCEDURE EVALUATION
PRE-OP EVALUATION    Patient Name: Luzmaria Stacy    Admit Diagnosis: Encounter for screening colonoscopy [Z12.11]    Pre-op Diagnosis: Encounter for screening colonoscopy [Z12.11]    COLONOSCOPY    Anesthesia Procedure: COLONOSCOPY    Surgeon(s) and Role:     * Horacio Bingham, DO - Primary    Pre-op vitals reviewed.  Temp: 97.6 °F (36.4 °C)  Pulse: 70  Resp: 16  BP: 133/74  SpO2: 100 %  Body mass index is 31.76 kg/m².    Current medications reviewed.  Hospital Medications:   lactated ringers infusion   Intravenous Continuous       Outpatient Medications:     Facility-Administered Medications Prior to Admission   Medication Dose Route Frequency Provider Last Rate Last Admin    omalizumab (Xolair) injection 300 mg 2.4 mL  300 mg Subcutaneous Q28 Days Polo Thomas MD   300 mg at 02/06/24 1338    [COMPLETED] Omalizumab (Xolair) 150 MG/ML SUBQ injection 300 mg 2 mL  300 mg Subcutaneous Once Polo Thomas MD   300 mg at 12/18/23 1055     Medications Prior to Admission   Medication Sig Dispense Refill Last Dose    fluticasone propionate 50 MCG/ACT Nasal Suspension by Each Nare route daily.   Past Week    Ascorbic Acid (VITAMIN C OR) Take by mouth.   Past Week    COLLAGEN OR Take by mouth.   Past Week    Probiotic Product (PROBIOTIC OR) Take by mouth.   Past Week    fexofenadine 180 MG Oral Tab Take 1 tablet (180 mg total) by mouth daily.   Past Week    Omalizumab (XOLAIR) 150 MG/ML Subcutaneous Solution Prefilled Syringe Inject 300 mg into the skin every 28 days. 2 mL 5 Past Month    Omalizumab (XOLAIR) 150 MG/ML Subcutaneous Solution Prefilled Syringe Inject 300 mg into the skin every 28 days. 2 mL 11 Past Month    EPINEPHrine (EPIPEN 2-CON) 0.3 MG/0.3ML Injection Solution Auto-injector Inject IM in event of  allergic reaction 1 each 0     PEG 3350-KCl-Na Bicarb-NaCl (TRILYTE) 420 g Oral Recon Soln Starting at 4:00 pm the night before procedure, drink 8 ounces of the prep every 15-20 minutes until finished  1 each 0     Omalizumab (XOLAIR) 150 MG/ML Subcutaneous Solution Prefilled Syringe Inject 300 mg into the skin once.   Past Month    diphenhydrAMINE HCl (BENADRYL ALLERGY OR) Take 2 tablets by mouth daily.   Past Week       Allergies: Ragweed      Anesthesia Evaluation        Anesthetic Complications           GI/Hepatic/Renal    Negative GI/hepatic/renal ROS.                             Cardiovascular        Exercise tolerance: good     MET: >4      (+) hypertension                                     Endo/Other    Negative endo/other ROS.                              Pulmonary    Negative pulmonary ROS.                       Neuro/Psych    Negative neuro/psych ROS.                                  Past Surgical History:   Procedure Laterality Date    DILATION/CURETTAGE,DIAGNOSTIC        91,96,99    TUBAL LIGATION  Tubes caderized in      Social History     Socioeconomic History    Marital status: Single   Tobacco Use    Smoking status: Never    Smokeless tobacco: Never   Vaping Use    Vaping Use: Never used   Substance and Sexual Activity    Alcohol use: Yes     Comment: On average a few drinks a week    Drug use: Not Currently     Types: Cannabis   Other Topics Concern    Caffeine Concern No    Exercise Yes    Seat Belt Yes    Special Diet No    Stress Concern No    Weight Concern No     History   Drug Use Unknown     Available pre-op labs reviewed.               Airway      Mallampati: II  Mouth opening: >3 FB  TM distance: > 6 cm  Neck ROM: full Cardiovascular    Cardiovascular exam normal.         Dental             Pulmonary    Pulmonary exam normal.                 Other findings  Dentition grossly intact.            ASA: 2   Plan: MAC  NPO status verified and patient meets guidelines.          Plan/risks discussed with: patient                Present on Admission:  **None**

## 2024-03-07 NOTE — OPERATIVE REPORT
[facility]    PATIENT'S NAME: BARBER RUIZ   ATTENDING PHYSICIAN: Horacio Bingham D.O.   OPERATING PHYSICIAN: Horacio Bingham D.O.   PATIENT ACCOUNT#:   [id number]    LOCATION:  [facility]  MEDICAL RECORD #:   [id number]       YOB: 1970  ADMISSION DATE:       03/07/2024      OPERATION DATE:  03/07/2024    OPERATIVE REPORT      PREOPERATIVE DIAGNOSIS:  Average-risk screening colonoscopy.  POSTOPERATIVE DIAGNOSIS:  Sigmoid diverticulosis and transverse colon polyp.  PROCEDURE:  Pancolonoscopy to cecum with forceps polypectomy, transverse colon.    ANESTHESIA:  MAC.    PREPARATION:  [place] scale 3 ascending, 3 transverse, 3 descending.     REPEAT COLONOSCOPY:  Based on pathology.    OPERATIVE TECHNIQUE:  An informed consent was previously obtained.  The patient was taken to the endoscopy suite and placed in the left lateral decubitus position.  Digital rectal examination was carried out.  Olympus colonoscope advanced into the rectal ampulla.  Scope was traversed through rectum, sigmoid, descending, transverse, and ascending colons under direct visualization of the lumen.  The base of the cecum was visualized.  Scope was slowly withdrawn through ascending, transverse, descending, sigmoid colon, and rectum, demonstrating no evidence of mucosal disease, masses, polyps, or other lesions with the exception of a 5 mm sessile polyp identified within the transverse colon, forceps polypectomied and removed, as well as sigmoid diverticulosis.    The scope was removed.  The patient tolerated the procedure well.    Dictated By Horacio Bingham D.O.  d: 03/07/2024 08:41:15  t: 03/07/2024 10:47:54  [facility] [id number]  /

## 2024-03-07 NOTE — DISCHARGE INSTRUCTIONS
Home Care Instructions for Colonoscopy and with Sedation    Diet:  - Resume your regular diet as tolerated unless otherwise instructed.  - Start with light meals to minimize bloating.  - Do not drink alcohol today.    Medication:  - If you have questions about resuming your normal medications, please contact your Primary Care Physician.    Activities:  - Take it easy today. Do not return to work today.  - Do not drive today.  - Do not operate any machinery today (including kitchen equipment).    Colonoscopy:  - You may notice some rectal \"spotting\" (a little blood on the toilet tissue) for a day or two after the exam. This is normal.  - If you experience any rectal bleeding (not spotting), persistent tenderness or sharp severe abdominal pains, oral temperature over 100 degrees Fahrenheit, light-headedness or dizziness, or any other problems, contact your doctor.      **If unable to reach your doctor, please go to the [facility] Emergency Room**    - Your referring physician will receive a full report of your examination.  - If you do not hear from your doctor's office within two weeks of your biopsy, please call them for your results.    You may be able to see your laboratory results in [employer] between 4 and 7 business days.  In some cases, your physician may not have viewed the results before they are released to [employer].  If you have questions regarding your results contact the physician who ordered the test/exam by phone or via [employer] by choosing \"Ask a Medical Question.\"

## 2024-03-07 NOTE — BRIEF OP NOTE
Pre-Operative Diagnosis: Encounter for screening colonoscopy [Z12.11]     Post-Operative Diagnosis: Polyp, Diverticulosis      Procedure Performed:   COLONOSCOPY with forcep polypectomy    Surgeon(s) and Role:     * Horacio Bingham DO - Primary    Assistant(s):        Surgical Findings:      Specimen:      Estimated Blood Loss: No data recorded    Dictation Number:      Horacio Bingham DO  3/7/2024  8:39 AM

## 2024-03-11 ENCOUNTER — PATIENT MESSAGE (OUTPATIENT)
Facility: LOCATION | Age: 54
End: 2024-03-11

## 2024-03-11 NOTE — PROGRESS NOTES
The polyp removed from your colon is benign.  I would recommend that you undergo your next colonoscopy in 10 years time.  Thank you Dr. Bingham

## 2024-04-02 ENCOUNTER — OFFICE VISIT (OUTPATIENT)
Dept: ALLERGY | Facility: CLINIC | Age: 54
End: 2024-04-02

## 2024-04-02 ENCOUNTER — NURSE ONLY (OUTPATIENT)
Dept: ALLERGY | Facility: CLINIC | Age: 54
End: 2024-04-02

## 2024-04-02 DIAGNOSIS — J30.2 SEASONAL AND PERENNIAL ALLERGIC RHINOCONJUNCTIVITIS: ICD-10-CM

## 2024-04-02 DIAGNOSIS — Z51.81 VISIT FOR MONITORING XOLAIR THERAPY: ICD-10-CM

## 2024-04-02 DIAGNOSIS — Z28.21 COVID-19 VACCINE DOSE DECLINED: ICD-10-CM

## 2024-04-02 DIAGNOSIS — Z23 FLU VACCINE NEED: ICD-10-CM

## 2024-04-02 DIAGNOSIS — L50.9 URTICARIA, UNSPECIFIED: Primary | ICD-10-CM

## 2024-04-02 DIAGNOSIS — J30.89 SEASONAL AND PERENNIAL ALLERGIC RHINOCONJUNCTIVITIS: ICD-10-CM

## 2024-04-02 DIAGNOSIS — H10.10 SEASONAL AND PERENNIAL ALLERGIC RHINOCONJUNCTIVITIS: ICD-10-CM

## 2024-04-02 DIAGNOSIS — L50.1 CHRONIC IDIOPATHIC URTICARIA: Primary | ICD-10-CM

## 2024-04-02 DIAGNOSIS — Z79.899 VISIT FOR MONITORING XOLAIR THERAPY: ICD-10-CM

## 2024-04-02 PROCEDURE — 96372 THER/PROPH/DIAG INJ SC/IM: CPT | Performed by: ALLERGY & IMMUNOLOGY

## 2024-04-02 PROCEDURE — 99214 OFFICE O/P EST MOD 30 MIN: CPT | Performed by: ALLERGY & IMMUNOLOGY

## 2024-04-02 NOTE — PROGRESS NOTES
Luzmaria Stacy is a 53 year old female.    HPI:     Chief Complaint   Patient presents with    Xolair     Follow-up visit for xolair. No hive outbreaks since last injection. Switches between allegra and Claritin and takes flonase.        Patient is a 53-year-old female who presents for follow-up.  Patient last by me in 2023  Patient has a history of chronic idiopathic urticaria and allergic rhinitis  Medication list include Xolair Flonase Allegra or EpiPen.    Review of immunization records notes no prior COVID-vaccine or flu vaccine    Today patient reports    CIU  Active or persistent symptoms: Denies.  No hives in the interim.  Overall much improved since starting Xolair 300 mg every 4 weeks.  No angioedema.  ED visits or prednisone in the interim denies  Denies side effects with Xolair  Active meds: Xolair 300 mg every 4 weeks  Claritin or Allegra  Xolair x 3 years  Trying to space xolair to every 7-8 weeks   Tried to stop xolair in past and lasted 3 months and symptoms returned  Epi utd   Xolair in office     Ar:  Active or persistent symptoms: sz  nc   No fever or MP   Active meds: flonase, claritin   pets : none    Nonsmoker        HISTORY:  Past Medical History:   Diagnosis Date    Allergic rhinitis 1996    Ragweed    Arthritis Last couple yrs    Mild    Essential hypertension Sometimes    Visual impairment       Past Surgical History:   Procedure Laterality Date    COLONOSCOPY N/A 3/7/2024    Procedure: COLONOSCOPY with forcep polypectomy;  Surgeon: Horacio Bingham DO;  Location:  ENDOSCOPY    DILATION/CURETTAGE,DIAGNOSTIC        91,96,99    TUBAL LIGATION  Tubes caderized in       Family History   Problem Relation Age of Onset    Cancer Mother     Hypertension Mother     Breast Cancer Mother     Asthma Father     Skin cancer Sister       Social History:   Social History     Socioeconomic History    Marital status: Single   Tobacco Use    Smoking status: Never     Smokeless tobacco: Never   Vaping Use    Vaping Use: Never used   Substance and Sexual Activity    Alcohol use: Yes     Comment: On average a few drinks a week    Drug use: Not Currently     Types: Cannabis   Other Topics Concern    Caffeine Concern No    Exercise Yes    Seat Belt Yes    Special Diet No    Stress Concern No    Weight Concern No        Medications (Active prior to today's visit):  Current Outpatient Medications   Medication Sig Dispense Refill    fluticasone propionate 50 MCG/ACT Nasal Suspension by Each Nare route daily.      Ascorbic Acid (VITAMIN C OR) Take by mouth.      COLLAGEN OR Take by mouth.      Probiotic Product (PROBIOTIC OR) Take by mouth.      fexofenadine 180 MG Oral Tab Take 1 tablet (180 mg total) by mouth daily.      Omalizumab (XOLAIR) 150 MG/ML Subcutaneous Solution Prefilled Syringe Inject 300 mg into the skin every 28 days. 2 mL 5    Omalizumab (XOLAIR) 150 MG/ML Subcutaneous Solution Prefilled Syringe Inject 300 mg into the skin every 28 days. 2 mL 11    Omalizumab (XOLAIR) 150 MG/ML Subcutaneous Solution Prefilled Syringe Inject 300 mg into the skin once.      diphenhydrAMINE HCl (BENADRYL ALLERGY OR) Take 2 tablets by mouth daily.      EPINEPHrine (EPIPEN 2-CON) 0.3 MG/0.3ML Injection Solution Auto-injector Inject IM in event of  allergic reaction (Patient not taking: Reported on 4/2/2024) 1 each 0    PEG 3350-KCl-Na Bicarb-NaCl (TRILYTE) 420 g Oral Recon Soln Starting at 4:00 pm the night before procedure, drink 8 ounces of the prep every 15-20 minutes until finished (Patient not taking: Reported on 4/2/2024) 1 each 0       Allergies:  Allergies   Allergen Reactions    Ragweed HIVES         ROS:   Allergic/Immuno:  See hpi  Cardiovascular:  Negative for irregular heartbeat/palpitations, chest pain, edema  Constitutional:  Negative night sweats,weight loss, irritability and lethargy  ENMT:  Negative for ear drainage, hearing loss and nasal drainage  Eyes:  Negative for eye  discharge and vision loss  Gastrointestinal:  Negative for abdominal pain, diarrhea and vomiting  Integumentary:  Negative for pruritus and rash  Respiratory:  Negative for cough, dyspnea and wheezing    PHYSICAL EXAM:   Constitutional: responsive, no acute distress noted  Head/Face: NC/Atraumatic  Eyes/Vision: conjunctiva and lids are normal extraocular motion is intact   Ears/Audiometry: tympanic membranes are normal bilaterally hearing is grossly intact  Nose/Mouth/Throat: nose and throat are clear mucous membranes are moist   Neck/Thyroid: neck is supple without adenopathy  Lymphatic: no abnormal cervical, supraclavicular or axillary adenopathy is noted  Respiratory: normal to inspection lungs are clear to auscultation bilaterally normal respiratory effort   Cardiovascular: regular rate and rhythm no murmurs, gallups, or rubs  Abdomen: soft non-tender non-distended  Skin/Hair: no unusual rashes present   Extremities: no edema, cyanosis, or clubbing     ASSESSMENT/PLAN:   Assessment   Encounter Diagnoses   Name Primary?    Chronic idiopathic urticaria Yes    Visit for monitoring Xolair therapy     Seasonal and perennial allergic rhinoconjunctivitis     COVID-19 vaccine dose declined     Flu vaccine need      #1 chronic idiopathic urticaria  Stable at this time with current regimen including Xolair 300 mg.  Current frequency is every 7 to 8 weeks without issues.  If any increase in hives may consider decreasing interval to every 4 weeks again.  Previous attempts to stop Xolair led to recurrence of symptoms within 3 months.  Patient hesitant to try off of Xolair at this time.  Will continue with current dosing and interval and reassess in 6 months.  Reviewed Xyzal is an antihistamine once a day up to 4 times per day as needed  No ED visits or prednisone in the interim  Xolair today in office       #2 visit for monitoring Xolair therapy.  Denies adverse reactions or side effects in the interim.    #3 allergic  rhinitis  Reviewed avoidance measures and potential treatment options and therapy  Continue with Claritin or Allegra or consider Xyzal, levocetirizine 5 mg a day.  May add Flonase or Nasacort 2 sprays per nostril once a day if having prominent nasal congestion or postnasal drip    #4 COVID-vaccine recommended reviewed I do not have my office    #5 flu vaccine recommended in the fall           Orders This Visit:  No orders of the defined types were placed in this encounter.      Meds This Visit:  Requested Prescriptions      No prescriptions requested or ordered in this encounter       Imaging & Referrals:  None     4/2/2024  Polo Thomas MD    If medication samples were provided today, they were provided solely for patient education and training related to self administration of these medications.  Teaching, instruction and sample was provided to the patient by myself.  Teaching included  a review of potential adverse side effects as well as potential efficacy.  Patient's questions were answered in regards to medication administration and dosing and potential side effects. Teaching was provided via the teach back method

## 2024-04-02 NOTE — PROGRESS NOTES
Per standing order patient to continue Xolair injections every 8 weeks- 300  mg.     Patient verified name, , and injection to be given.     At 1435, Xolair 300 mg administered subcutaneously to bilateral upper arms . 150 mg administered to left upper arm. 150 mg to right upper arm      Xolair 150 mg/mL prefilled syringe x 2  Lot #: [id number]  Exp Date: SEP 2024      Patient tolerated injection with no adverse side effects noted at time of administration.     Patient monitored x 30 min after administration.   At 1505, patient released from office without any sign/symptoms of local or systemic reaction.    Patient scheduled next Biologic Injection Appointment for 24

## 2024-04-10 ENCOUNTER — TELEPHONE (OUTPATIENT)
Dept: ALLERGY | Facility: CLINIC | Age: 54
End: 2024-04-10

## 2024-04-10 NOTE — TELEPHONE ENCOUNTER
Called [facility] to coordinate delivery of Xolair.    Spoke to: Yanira    Next Injection Appointment Date: 5/21    Expected Xolair Delivery Date: 4/25    Confirmed dose and shipping address.

## 2024-05-21 ENCOUNTER — NURSE ONLY (OUTPATIENT)
Dept: ALLERGY | Facility: CLINIC | Age: 54
End: 2024-05-21

## 2024-05-21 VITALS
DIASTOLIC BLOOD PRESSURE: 88 MMHG | HEART RATE: 77 BPM | WEIGHT: 185 LBS | SYSTOLIC BLOOD PRESSURE: 126 MMHG | BODY MASS INDEX: 31.58 KG/M2 | OXYGEN SATURATION: 95 % | HEIGHT: 64 IN

## 2024-05-21 DIAGNOSIS — L50.1 IDIOPATHIC URTICARIA: Primary | ICD-10-CM

## 2024-05-21 PROCEDURE — 3079F DIAST BP 80-89 MM HG: CPT | Performed by: ALLERGY & IMMUNOLOGY

## 2024-05-21 PROCEDURE — 3074F SYST BP LT 130 MM HG: CPT | Performed by: ALLERGY & IMMUNOLOGY

## 2024-05-21 PROCEDURE — 3008F BODY MASS INDEX DOCD: CPT | Performed by: ALLERGY & IMMUNOLOGY

## 2024-05-21 PROCEDURE — 96372 THER/PROPH/DIAG INJ SC/IM: CPT | Performed by: ALLERGY & IMMUNOLOGY

## 2024-05-21 NOTE — PROGRESS NOTES
Patient presented to the office for Xolair injections for chronic hives.    See vital sign template for vital signs.    Reviewed allergies, histories and medications with patient.     Xolair 150 mg given left upper arm subcutaneously.  Xolair 150 mg given right upper arm subcutaneously.    Patient was observed in the office for 30 minutes and was reassessed  and was released from the office without complications.    Xolair- LOT#[id number]              EXP.05/2025              NDC 20947-141-86    NEXT APPOINTMENT SCHEDULED FOR 7/9/24.

## 2024-05-23 ENCOUNTER — TELEPHONE (OUTPATIENT)
Dept: ALLERGY | Facility: CLINIC | Age: 54
End: 2024-05-23

## 2024-05-23 NOTE — TELEPHONE ENCOUNTER
[facility] contacted at [phone].     RN spoke with pharmacy service repShelia.     Xolair 150 mg/mL prefilled syringe x2 scheduled for delivery to physician's office: 5/29/2024.     Patient's next Xolair injection appointment scheduled for 7/9/2024.

## 2024-06-10 RX ORDER — OMALIZUMAB 150 MG/ML
300 INJECTION, SOLUTION SUBCUTANEOUS
Qty: 2 ML | Refills: 2 | Status: SHIPPED | OUTPATIENT
Start: 2024-06-10

## 2024-06-10 NOTE — TELEPHONE ENCOUNTER
Refill requested for    Name from pharmacy: XOLAIR 150 MG/ML SYRINGE         Will file in chart as: XOLAIR 150 MG/ML Subcutaneous Solution Prefilled Syringe    Sig: INJECT 300 MG UNDER THE SKIN EVERY 28 DAYS    Disp: 2 mL    Refills: 5 (Pharmacy requested: Not specified)    Start: 6/10/2024    Class: Normal    Non-formulary    Last ordered: 8 months ago (10/2/2023) by Polo Thomas MD    Last refill: 5/28/2024    Rx #: [id number]    Biologic Medications Gvowtk42/10/2024 06:11 AM   Protocol Details Appt in past 6 mos or next 3 mos      To be filled at: [address] [phone], [phone]          Last office visit: 4/2/2024     Previously advised to follow up in 6 months     F/U currently scheduled? Not at this time. [employer] message sent to schedule in October       ACTION: Refilled per protocol.

## 2024-07-09 ENCOUNTER — NURSE ONLY (OUTPATIENT)
Dept: ALLERGY | Facility: CLINIC | Age: 54
End: 2024-07-09
Payer: COMMERCIAL

## 2024-07-09 VITALS
TEMPERATURE: 98 F | RESPIRATION RATE: 20 BRPM | DIASTOLIC BLOOD PRESSURE: 84 MMHG | OXYGEN SATURATION: 98 % | HEART RATE: 77 BPM | SYSTOLIC BLOOD PRESSURE: 131 MMHG

## 2024-07-09 DIAGNOSIS — L50.1 CHRONIC IDIOPATHIC URTICARIA: Primary | ICD-10-CM

## 2024-07-09 PROCEDURE — 3079F DIAST BP 80-89 MM HG: CPT | Performed by: ALLERGY & IMMUNOLOGY

## 2024-07-09 PROCEDURE — 96372 THER/PROPH/DIAG INJ SC/IM: CPT | Performed by: ALLERGY & IMMUNOLOGY

## 2024-07-09 PROCEDURE — 3075F SYST BP GE 130 - 139MM HG: CPT | Performed by: ALLERGY & IMMUNOLOGY

## 2024-07-09 RX ORDER — LEVOCETIRIZINE DIHYDROCHLORIDE 5 MG/1
5 TABLET, FILM COATED ORAL EVERY EVENING
COMMUNITY

## 2024-07-09 NOTE — PROGRESS NOTES
Per standing order patient to continue Xolair injections every 8 weeks- 300 mg.     Patient verified name, , and injection to be given.     Xolair 150 mg/mL prefilled syringe x2  NDC #: 36843-171-94  Lot #: [id number]  Exp Date: 2025    At 1056, Xolair 150 mg/mL prefilled syringe adminstered subcutaneously to Right and Left Upper Arm.     Patient tolerated injection with no adverse side effects noted at time of administration.         ?  Patient monitored x 30 min after administration.   At 1126, patient released from office without any sign/symptoms of local or systemic reaction.    Pre PF: (3 readings) for asthma diagnosis  Post PF: (3 readings)  ?  Patient scheduled next Biologic Injection Appointment for 2024 at 10:40.

## 2024-07-23 ENCOUNTER — TELEPHONE (OUTPATIENT)
Dept: ALLERGY | Facility: CLINIC | Age: 54
End: 2024-07-23

## 2024-07-23 NOTE — TELEPHONE ENCOUNTER
[facility] contacted at [phone].     RN spoke with pharmacy service Yanira alonso.     Attempted to schedule delivery of patient's Xolair.     Rep offers that patient's permission to ship is required.     Rep left voicemail for patient asking that she [facility] and give [facility] to ship Xolair to the physician's office.

## 2024-07-25 NOTE — TELEPHONE ENCOUNTER
[facility] contacted at [phone].      RN spoke with pharmacy service Radha alonso.      Attempted to schedule delivery of patient's Xolair.      Rep offers that patient's permission to ship is required.      Rep left voicemail for patient [facility] and [facility]air to the physician's office.

## 2024-07-25 NOTE — TELEPHONE ENCOUNTER
[employer] message sent to patient as below.      Jorge Dutta,     I attempted to reach [facility] to [facility] in order to schedule the Xolair's next delivery date to the physician's office.       [facility] requires permission from you to ship the Xolair to the physician's office.      Currently, I do not have a dose for Xolair in our office.      Please call [facility] at [phone] in order to wade permission for our office to schedule delivery of Xolair.      Thank you,     Yanira BROWN. DINO

## 2024-07-30 NOTE — TELEPHONE ENCOUNTER
Per [facility] Provider Portal.  Patient has not yet called [facility] in order to wade permission to ship Xolair to physician's office.

## 2024-08-05 NOTE — TELEPHONE ENCOUNTER
Patient contacted and states that she has her  Xolair injection appointment scheduled for 8/27/2024.

## 2024-08-27 ENCOUNTER — NURSE ONLY (OUTPATIENT)
Dept: ALLERGY | Facility: CLINIC | Age: 54
End: 2024-08-27
Payer: COMMERCIAL

## 2024-08-27 VITALS
TEMPERATURE: 98 F | RESPIRATION RATE: 20 BRPM | HEART RATE: 73 BPM | OXYGEN SATURATION: 99 % | SYSTOLIC BLOOD PRESSURE: 137 MMHG | DIASTOLIC BLOOD PRESSURE: 82 MMHG

## 2024-08-27 DIAGNOSIS — L50.1 CHRONIC IDIOPATHIC URTICARIA: Primary | ICD-10-CM

## 2024-08-27 PROCEDURE — 96372 THER/PROPH/DIAG INJ SC/IM: CPT | Performed by: ALLERGY & IMMUNOLOGY

## 2024-08-27 PROCEDURE — 3079F DIAST BP 80-89 MM HG: CPT | Performed by: ALLERGY & IMMUNOLOGY

## 2024-08-27 PROCEDURE — 3075F SYST BP GE 130 - 139MM HG: CPT | Performed by: ALLERGY & IMMUNOLOGY

## 2024-08-27 NOTE — PROGRESS NOTES
Per standing order patient to continue Xolair injections every 28 days- 300 mg.     Patient verified name, , and injection to be given.     Xolair 150 mg/mL prefilled syringe x2  NDC#s\" 78936-253-69  Lot #s: [id number]  Exp Date: 2025    At 1054, Xolair 150 mg/mL prefilled syringe administered subcutaneously to Right and Left Upper Arms.     Patient tolerated injection with no adverse side effects noted at time of administration.         ?  Patient monitored x 30 min after administration.   At 1125, patient released from office without any sign/symptoms of local or systemic reaction.    Pre PF: (3 readings) for asthma diagnosis  Post PF: (3 readings)  ?  Patient scheduled next Biologic Injection Appointment for 10/8/2024 at 10:50 am.

## 2024-09-03 ENCOUNTER — PATIENT MESSAGE (OUTPATIENT)
Dept: ALLERGY | Facility: CLINIC | Age: 54
End: 2024-09-03

## 2024-09-04 ENCOUNTER — TELEPHONE (OUTPATIENT)
Dept: ALLERGY | Facility: CLINIC | Age: 54
End: 2024-09-04

## 2024-09-04 NOTE — TELEPHONE ENCOUNTER
See [employer] message sent to patient via [employer].       Alec Dutta,     Prior Authorizations for the biologic patient's are completed in order of the soonest prior authorization expiration date.      I did complete a prior authorization for Xolair through [employer] today, 9/4/2024.       It usually takes up to 72 hours to receive a response from insurance.      I did try to request a referral/prior authorization through [facility] as you are receiving the Xolair in the office and not injecting in the home setting.  [facility] has been denying my requests for Xolair as they are now able to be covered under the pharmacy benefit.      Due to the Referral/prior authorization for Xolair most likely being denied by [facility], please reach out to your Primary Care Physician and ask for referral for 99 visits for Xolair injections.         I will be in touch once I receive a response from [employer] regarding Xolair prior authorization response.         Regards,        Yanira BROWN RN

## 2024-09-04 NOTE — TELEPHONE ENCOUNTER
From: Luzmaria Stacy  To: Polo Thomas  Sent: 9/3/2024 5:56 PM CDT  Subject: Letter I received for xolair approval     I received a letter from my insurance company telling me to check with your office. They tried reaching you and have had no response. It’s about my xolair authorization by you.   Their fax number is [phone]   (i.e.,[employer])   Thank you, Luzmaria

## 2024-09-04 NOTE — TELEPHONE ENCOUNTER
Completed a prior authorization/referral request for Xolair and injection visits through [facility].    [facility] has been recently denying Xolair referral/prior authorization requests as they can be covered under the pharmacy benefit.       [employer] Xolair Prior Authorization Form completed and along with copy of 4/2/2024 Allergy Physician Office Visit faxed to [employer] at [phone].       Fax confirmation received.     Await prior authorization response from [employer].     [employer] message sent to patient reporting above, and asked patient to request from Primary Care Physician a referral for 99 injection visits.

## 2024-09-05 NOTE — TELEPHONE ENCOUNTER
Luzmaria Norberto Regis KEANE  Allergy Clinical Staff (supporting Lencho Thomas MD)22 hours ago (4:26 PM)     LN  I’m a little confused, my xolair is being donated by [employer](spelling). Do I need to go to my primary because it’s been a year since I saw him? Do I need to make an office visit with him to get a referral?      See [employer] response sent to patient as below.      Alec Dutta,        I am working with [facility] in order to have referral/prior authorization for Xolair approved.     The Xolair medication was covered under your Pharmacy Benefit, [employer], for the last year. Because you were receiving Xolair through your pharmacy benefit, [employer] Xolair Access [employer] provides a copayment assistance.  [employer] does not donate the Xolair.      The actual visits for the Xolair injections were covered under the below referral.         PATIENT NAME:  LUZMARIA RUIZ/ [phone] (home)/ There is no work phone number on file.  PATIENT :  08/15/1970  REFERRAL ID #:  [id number]  REFERRAL STATUS:  Authorized [1]  REVIEW REFERRAL NOTES FOR MORE INFORMATION:  DATE AUTHORIZED:  2023 // EXPIRATION DATE: 09/10/2024   REFERRED BY:  JIMBO MARQUES MD (TEL: [phone])  REFERRED TO: LENCHO THOMAS MD (TEL: [phone])     No vendor specified on referral. / No vendor phone number known.  No facility specified on referral  REFERRED FOR:    Diagnoses:    T78.40XA (ICD-10-CM) - 995.3 (ICD-9-CM) - Allergy, initial encounter  Procedures:     [id number] - ALLERGY - INTERNAL   NUMBER OF VISITS AUTH: 99        If [facility] approves the new referral for Xolair which will cover both your medication and injection visits and you will not need to request a referral from your Primary Care Physician.      If you wish to continue to have Xolair covered through [employer] you may do so, but you will need to have an additional referral for Xolair injections through your  Primary Care Physician.      You would need to contact your Primary Care Physician to inquire if a visit is needed for the above referral.      I was able to receive approval for Xolair under [employer] for dates 8/6/2024-9/5/2025, but technically, you should use the Medical Policy coverage in order to receive the injections in the office.       I will be able to receive a Xolair Prior Authorization/Referral for your next Xolair injection visit.  Thus, it would not be necessary to ask for a referral from your Primary Care Physician unless this makes you feel more comfortable.      Once I receive the Xolair Prior Authorization/ Referral from [facility], I will be in touch with further instructions, as you will need to order Xolair through [facility], not [facility]         Yanira Anand RN

## 2024-09-05 NOTE — TELEPHONE ENCOUNTER
Fax received form [employer] reporting Xolair 300 mg/2 mL prefilled syringe.     Effective Dates:    08/06/2024-9/05/2025    PA#: [id number]      Awaiting PA/referral response from [employer], as patient should receive coverage through [employer] as patient is receiving injections in-office.

## 2024-09-10 NOTE — TELEPHONE ENCOUNTER
Patient's prior authorization [facility] has been approved.     Covered Specialty Pharmacy now [facility].     Please see pended prescription for Xolair

## 2024-09-10 NOTE — TELEPHONE ENCOUNTER
[facility] Referral/Prior Authorization for Xolair received via fax.     Covered Specialty Pharmacy:  [facility]    Xolair 300 mg/2 mL prefilled syringe or Xolair 150 mg/ mL prefilled syringe x2.    Effective Dates:     9/5/2024 - 9/5/2025      PA#: [id number]

## 2024-09-11 NOTE — TELEPHONE ENCOUNTER
[facility] contacted at [phone].     RN spoke with pharmacy , Marcos.     RN reported Xolair prior authorization through [facility] as below . . .    [facility] Referral/Prior Authorization for Xolair received via fax.      Covered Specialty Pharmacy:  [facility]     Xolair 300 mg/2 mL prefilled syringe or Xolair 150 mg/ mL prefilled syringe x2.     Effective Dates:      9/5/2024 - 9/5/2025        PA#: [id number]

## 2024-09-11 NOTE — TELEPHONE ENCOUNTER
Fax received from [facility] asking that physician's office fax to pharmacy list of medication allergies, IgE level, ICD-10 Diagnosis and list of current medications.     Form as above completed and faxed to [facility] at [phone].     Fax confirmation received.

## 2024-09-11 NOTE — TELEPHONE ENCOUNTER
Copy of [employer] [facility] Prior Authorization/Referral letter faxed to [facility] Insurance Verification Team at [phone].      Fax confirmation received.

## 2024-09-12 NOTE — TELEPHONE ENCOUNTER
Please see [employer] message sent to patient as below . . .    Alec Dutta     [facility] the Xolair Referral/Prior Authorization. This referral/prior authorization will cover both the Xolair medication itself and the nurse injection visits.      Your covered specialty pharmacy is [facility] (Telephone Number [phone]).  A Xolair prescription has been sent to your specialty pharmacy.     You can report your Xolair [employer] Copayment Assistance to [facility].  It may not be necessary as you may have a $0 copayment.      Also, please wade physician's office permission to schedule delivery of Xolair to physician's office.      Please reach out to the Allergy Office if you have any further questions.         Have a nice day,      Yanira BROWN RN

## 2024-09-16 NOTE — TELEPHONE ENCOUNTER
Marley's call (pharmacy  with [facility]) transferred from phone room.      Xolair 300 mg/2 mL prefilled syringe x1 scheduled for delivery to physician's office: 9/18/2024.     Patient's next Xolair injection appointment: 10/8/2024.

## 2024-09-16 NOTE — TELEPHONE ENCOUNTER
Marley/[facility]: [phone] would like to speak with the nurse to schedule delivery for the Xolair medication.

## 2024-09-19 NOTE — TELEPHONE ENCOUNTER
Xolair 300 mg/2 mL prefilled syringe received on 9/18/2024 and placed in medication refrigerator upon arrival.

## 2024-10-08 ENCOUNTER — TELEPHONE (OUTPATIENT)
Dept: ALLERGY | Facility: CLINIC | Age: 54
End: 2024-10-08

## 2024-10-08 ENCOUNTER — NURSE ONLY (OUTPATIENT)
Dept: ALLERGY | Facility: CLINIC | Age: 54
End: 2024-10-08
Payer: COMMERCIAL

## 2024-10-08 VITALS
OXYGEN SATURATION: 97 % | SYSTOLIC BLOOD PRESSURE: 154 MMHG | HEART RATE: 68 BPM | DIASTOLIC BLOOD PRESSURE: 102 MMHG | RESPIRATION RATE: 18 BRPM

## 2024-10-08 DIAGNOSIS — L50.1 CHRONIC IDIOPATHIC URTICARIA: Primary | ICD-10-CM

## 2024-10-08 PROCEDURE — 3080F DIAST BP >= 90 MM HG: CPT | Performed by: ALLERGY & IMMUNOLOGY

## 2024-10-08 PROCEDURE — 96372 THER/PROPH/DIAG INJ SC/IM: CPT | Performed by: ALLERGY & IMMUNOLOGY

## 2024-10-08 PROCEDURE — 3077F SYST BP >= 140 MM HG: CPT | Performed by: ALLERGY & IMMUNOLOGY

## 2024-10-08 NOTE — PROGRESS NOTES
Xolair Progress Note:     Pre-treatment Peak Flow: NA  Post treatment Peak Flow: NA  See \"vitals\" flow sheet for vital sign detail.   See MAR for injection detail.     Per standing order pt to continue Xolair injections every 4 weeks 300 mg.   PT verified name, , and injection to be given. Xolair 150 mg SC given upper right and left arms at 1059 .    Xolair Exp: 2025  Xolair Lot # [id number]    Patient Status: Patient d/c'd home 30 minutes s/p Xolair injections for idiopathic urticaria. Patient  tolerates injection without complications. Site WNL. No further questions or concerns at this time.  Return to clinic for next injection as advised by Dr. Thomas.     Time d/c to home: 1119  Next appointment scheduled for:

## 2024-10-08 NOTE — TELEPHONE ENCOUNTER
RN called to [facility]   Spoke to Irwin   Arranged delivery for Xolair 300 mg/2 mL  Scheduled delivery for 10/10/24  Next appointment 11/5/24

## 2024-10-08 NOTE — TELEPHONE ENCOUNTER
RN called pt and provided her with Dr. Thomas's recommendations listed below.    RN provided her with recommended steroid taper. She reports that she will not have enough steroids to taper that long.      RN advised that in order for Dr. Thomas to send additional steroids that she really needs to be seen for safety purposes.    Pt agreeable to being seen for video visit tomorrow at 1:45--pt scheduled.      Pt reports that she still has some numbness and \"novocain feeling\" in her jaw.  She denies any chest pain or discomfort.  She denies any facial paralysis, one-sided paralysis to her body or difficulty speaking.  RN advised to always be watchful for any cardiac or stroke symptoms and follow up in the ER immediately if symptoms were to worsen.  Pt verbalizes understanding.      When scheduling pt for a follow up, RN noticed that she will need a referral since she has an HMO plan.  RN notified pt via [employer] message that she will need a referral as well.

## 2024-10-08 NOTE — TELEPHONE ENCOUNTER
I did not prescribe the steroids.  With that being said , patient may consider tapering by 10 mg every 3 days.  30 mg x 3 days then 20 mg x 3 days then 10 mg x 3 days

## 2024-10-08 NOTE — TELEPHONE ENCOUNTER
Reviewed and noted.  Agree with triage advice provided.  May increase Xyzal up to 4 times per day.  Please return to Xolair 300 mg every 4 weeks.  If having flares .  Patient declined appointment tomorrow.  Reviewed does not my policy to prescribe prednisone over the phone may consider urgent care evaluation of worsening symptoms

## 2024-10-08 NOTE — TELEPHONE ENCOUNTER
Pt in office today for routine Xolair for CIU.  She typically does Xolair lina 7-8 weeks per her request from the beginning as she does not like taking any medication.    Pt reports that her CIU has been flaring and now has her next Xolair scheduled for 4 weeks from now.    Pt reports that over the weekend her jaw began to burn and feel numb--she wasn't sure if it was dental issue.  She reports that this is a common symptom she has prior to breaking out in hives.  She denies having any facial paralysis, one-sided body paralysis, facial drooping or slurring of words--she is aware if that were to occur to follow up in ER immediately.    She denies having any active hives--reports that the numbness in her jaw typically occurs before she breaks out in hives  She denies having any difficulties breathing, talking or swallowing--also aware to follow up in ER if that were to occur.    Pt taking Xyzal 2-3 times daily  Also taking 3 Benadryl tables at night (75mg total)--RN advised that is too much benadryl at once.    Pt reports that she had some left over prednisone from a previous doctor a couple of years ago and began taking it as follows (unsure if it is  or not):     PM took 40mg at once (10mg tablets)  Monday AM 35mg  Monday PM 30mg  This morning 30mg  Tonaarti believes she will take another 30mg    Pt reports she will taper herself down by 5mg each day.    Pt asking for additional prednisone to have on hand--RN advised that Dr. Thomas will not prescribe steroids without seeing a pt due to safety.  RN offered pt the 1:45 follow up time slot for tomorrow--pt denies due to work--offered to make it a virtual visit, however pt declined.  RN offered virtual visit on Monday at 4:45pm--pt declined.    Pt has routine follow up on  2025  RN to send to Dr. Thomas for further recommendations.

## 2024-10-08 NOTE — TELEPHONE ENCOUNTER
Dr. Thomas in regards to pt tapering herself on her at home steroids, how do you recommends he proceed?    Sunday PM took 40mg at once (10mg tablets)  Monday AM 35mg  Monday PM 30mg  This morning 30mg  Tonight believes she will take another 30mg     Pt reports she will taper herself down by 5mg each day.      RN to call pt with Dr. Thomas's recommendations listed below.

## 2024-10-09 ENCOUNTER — TELEMEDICINE (OUTPATIENT)
Dept: ALLERGY | Facility: CLINIC | Age: 54
End: 2024-10-09
Payer: COMMERCIAL

## 2024-10-09 DIAGNOSIS — Z23 NEED FOR COVID-19 VACCINE: ICD-10-CM

## 2024-10-09 DIAGNOSIS — Z79.899 VISIT FOR MONITORING XOLAIR THERAPY: ICD-10-CM

## 2024-10-09 DIAGNOSIS — Z23 FLU VACCINE NEED: ICD-10-CM

## 2024-10-09 DIAGNOSIS — J30.89 SEASONAL AND PERENNIAL ALLERGIC RHINOCONJUNCTIVITIS: ICD-10-CM

## 2024-10-09 DIAGNOSIS — Z51.81 VISIT FOR MONITORING XOLAIR THERAPY: ICD-10-CM

## 2024-10-09 DIAGNOSIS — L50.1 CHRONIC IDIOPATHIC URTICARIA: Primary | ICD-10-CM

## 2024-10-09 DIAGNOSIS — J30.2 SEASONAL AND PERENNIAL ALLERGIC RHINOCONJUNCTIVITIS: ICD-10-CM

## 2024-10-09 DIAGNOSIS — H10.10 SEASONAL AND PERENNIAL ALLERGIC RHINOCONJUNCTIVITIS: ICD-10-CM

## 2024-10-09 PROCEDURE — 99214 OFFICE O/P EST MOD 30 MIN: CPT | Performed by: ALLERGY & IMMUNOLOGY

## 2024-10-09 RX ORDER — PREDNISONE 10 MG/1
TABLET ORAL
Qty: 38 TABLET | Refills: 0 | Status: SHIPPED | OUTPATIENT
Start: 2024-10-09

## 2024-10-09 NOTE — PROGRESS NOTES
Luzmaria Stacy is a 54 year old female.    HPI:   No chief complaint on file.      Patient is a 54-year-old female who presents for follow-up via video visit    This visit is conducted using Telemedicine with live, interactive video and audio during this Coronavirus pandemic.     Please note that the following visit was completed using two-way, real-time interactive audio and/or video communication.  This has been done in good alyx to provide continuity of care in the best interest of the provider-patient relationship, due to the ongoing public health crisis/national emergency and because of restrictions of visitation.  There are limitations of this visit as no physical exam could be performed.  Every conscious effort was taken to allow for sufficient and adequate time.  This billing was spent on reviewing labs, medications, radiology tests and decision making.  Appropriate medical decision-making and tests are ordered as detailed in the plan of care below     patient last seen by Buzz April 2024  Patient with history of chronic idiopathic urticaria and allergic rhinitis.  Medications include Xolair 300 mg, Xyzal, Flonase.    Patient had previously been able to wean out her Xolair dosing from every 4 weeks up to every 7 to 8 weeks.  Patient with recent flare of her urticaria./swelling   Patient currently on some prednisone which she had at home.  Denies oral swelling or respiratory issues.  Denies adverse events with Xolair in the interim.    Chronic idiopathic urticaria  Recent flare over the past week or so.  Currently on Xyzal up to 4 times per day and prednisone.  No current hives today   Some facial swelling near tooth,   Saw dentist, no tooth issues  + cheek swelling , no pain  Prior pred was old  Prior xolair was 7 weeks ago   Flare x 10 days, sz, hives   No lip or swelling   Next xolair in 4 weeks  11/5/24 . Last was yesterday       Allergic rhinitis  + runny nose, sz   No fever or sinus pan     No  fevers or mucopurulence.        HISTORY:  Past Medical History:    Allergic rhinitis    Ragweed    Arthritis    Mild    Essential hypertension    Visual impairment      Past Surgical History:   Procedure Laterality Date    Colonoscopy N/A 3/7/2024    Procedure: COLONOSCOPY with forcep polypectomy;  Surgeon: Horacio Bingham DO;  Location:  ENDOSCOPY    Dilation/curettage,diagnostic        91,96,99    Tubal ligation  Tubes caderized in       Family History   Problem Relation Age of Onset    Cancer Mother     Hypertension Mother     Breast Cancer Mother     Asthma Father     Skin cancer Sister       Social History:   Social History     Socioeconomic History    Marital status: Single   Tobacco Use    Smoking status: Never     Passive exposure: Never    Smokeless tobacco: Never   Vaping Use    Vaping status: Never Used   Substance and Sexual Activity    Alcohol use: Yes     Comment: On average a few drinks a week    Drug use: Not Currently     Types: Cannabis   Other Topics Concern    Caffeine Concern No    Exercise Yes    Seat Belt Yes    Special Diet No    Stress Concern No    Weight Concern No        Medications (Active prior to today's visit):  Current Outpatient Medications   Medication Sig Dispense Refill    predniSONE 10 MG Oral Tab Take 40mg q day x 5 days,then 30mg x 3 days,then 20 mg x 3 days,then10 mg x 3 days with food 38 tablet 0    omalizumab 300 mg/2mL Subcutaneous Solution Prefilled Syringe Inject 300 mg into the skin every 28 days. 2 mL 2    levocetirizine 5 MG Oral Tab Take 1 tablet (5 mg total) by mouth every evening.      Omalizumab (XOLAIR) 150 MG/ML Subcutaneous Solution Prefilled Syringe INJECT 300 MG UNDER THE SKIN EVERY 28 DAYS 2 mL 2    fluticasone propionate 50 MCG/ACT Nasal Suspension by Each Nare route daily.      Ascorbic Acid (VITAMIN C OR) Take by mouth.      COLLAGEN OR Take by mouth.      Probiotic Product (PROBIOTIC OR) Take by mouth.      fexofenadine 180 MG Oral Tab Take 1  tablet (180 mg total) by mouth daily. (Patient not taking: Reported on 5/21/2024)      Omalizumab (XOLAIR) 150 MG/ML Subcutaneous Solution Prefilled Syringe Inject 300 mg into the skin every 28 days. 2 mL 11    EPINEPHrine (EPIPEN 2-CON) 0.3 MG/0.3ML Injection Solution Auto-injector Inject IM in event of  allergic reaction 1 each 0    PEG 3350-KCl-Na Bicarb-NaCl (TRILYTE) 420 g Oral Recon Soln Starting at 4:00 pm the night before procedure, drink 8 ounces of the prep every 15-20 minutes until finished (Patient not taking: Reported on 4/2/2024) 1 each 0    Omalizumab (XOLAIR) 150 MG/ML Subcutaneous Solution Prefilled Syringe Inject 300 mg into the skin once.      diphenhydrAMINE HCl (BENADRYL ALLERGY OR) Take 2 tablets by mouth daily.         Allergies:  Allergies[1]      ROS:   Allergic/Immuno:  See hpi  Cardiovascular:  Negative for irregular heartbeat/palpitations, chest pain, edema  Constitutional:  Negative night sweats,weight loss, irritability and lethargy  ENMT:  Negative for ear drainage, hearing loss and nasal drainage  Eyes:  Negative for eye discharge and vision loss  Gastrointestinal:  Negative for abdominal pain, diarrhea and vomiting  Integumentary:  Negative for pruritus and rash  Respiratory:  Negative for cough, dyspnea and wheezing    PHYSICAL EXAM:   Constitutional: responsive, no acute distress noted  Head/Face: NC/Atraumatic  Speaking in full sentences no increased work of breathing  A&O x 3     ASSESSMENT/PLAN:   Assessment   Encounter Diagnoses   Name Primary?    Chronic idiopathic urticaria [L50.1] Yes    Visit for monitoring Xolair therapy     Seasonal and perennial allergic rhinoconjunctivitis     Flu vaccine need     Need for COVID-19 vaccine        #1 chronic idiopathic urticaria  + flare   Recent episode of cheek swelling.  No hives.  Patient did receive Xolair yesterday after having gone 7 weeks without it.  Suspect more angioedema component.  Prior dental evaluation was negative.   Denies pain along her cheek line so salivary gland issue is less likely  Xolair was given yesterday.  Next dose of Xolair in 4 weeks  Xyzal 5 mg up to 4 times per day  Prescription for prednisone provided 40 mg a day x 5 days with food and then taper by 10 mg every 3 days for a total of 2 weeks with taper      2.  Visit for monitoring Xolair therapy..  Denies adverse events with Xolair today.    #3 allergic rhinitis  Reviewed avoidance measures and potential treatment option of immunotherapy    #4 flu vaccine recommended in the fall.  We do not have flu vaccine in stock yet.  Please check with local pharmacy    #5 COVID-vaccine booster recommended.  Please check with local pharmacy as we do not stock this vaccine         Orders This Visit:  No orders of the defined types were placed in this encounter.      Meds This Visit:  Requested Prescriptions     Signed Prescriptions Disp Refills    predniSONE 10 MG Oral Tab 38 tablet 0     Sig: Take 40mg q day x 5 days,then 30mg x 3 days,then 20 mg x 3 days,then10 mg x 3 days with food       Imaging & Referrals:  None     10/9/2024  Polo Thomas MD    If medication samples were provided today, they were provided solely for patient education and training related to self administration of these medications.  Teaching, instruction and sample was provided to the patient by myself.  Teaching included  a review of potential adverse side effects as well as potential efficacy.  Patient's questions were answered in regards to medication administration and dosing and potential side effects. Teaching was provided via the teach back method           [1]   Allergies  Allergen Reactions    Ragweed HIVES

## 2024-10-09 NOTE — PATIENT INSTRUCTIONS
#1 chronic idiopathic urticaria  Recent episode of cheek swelling.  No hives.  Patient did receive Xolair yesterday after having gone 7 weeks without it.  Suspect more angioedema component.  Prior dental evaluation was negative.  Denies pain along her cheek line so salivary gland issue is less likely  Xolair was given yesterday.  Next dose of Xolair in 4 weeks  Xyzal 5 mg up to 4 times per day  Prescription for prednisone provided 40 mg a day x 5 days with food and then taper by 10 mg every 3 days for a total of 2 weeks with taper      2.  Visit for monitoring Xolair therapy..  Denies adverse events with Xolair today.    #3 allergic rhinitis  Reviewed avoidance measures and potential treatment option of immunotherapy    #4 flu vaccine recommended in the fall.  We do not have flu vaccine in stock yet.  Please check with local pharmacy    #5 COVID-vaccine booster recommended.  Please check with local pharmacy as we do not stock this vaccine         Orders This Visit:  No orders of the defined types were placed in this encounter.      Meds This Visit:  Requested Prescriptions     Signed Prescriptions Disp Refills    predniSONE 10 MG Oral Tab 38 tablet 0     Sig: Take 40mg q day x 5 days,then 30mg x 3 days,then 20 mg x 3 days,then10 mg x 3 days with food

## 2024-10-30 ENCOUNTER — TELEPHONE (OUTPATIENT)
Dept: ALLERGY | Facility: CLINIC | Age: 54
End: 2024-10-30

## 2024-10-30 NOTE — TELEPHONE ENCOUNTER
Bernice from [facility] would like to arrange a deliver of Xolair for the patient.  Please call her back at:    [phone]

## 2024-10-30 NOTE — TELEPHONE ENCOUNTER
RN called to [facility]  Spoke to Bernice  Delivery for Xolair 300 mg x2 set to deliver tomorrow 10/31/24  Next scheduled appointment for 11/5/24      Patient now receiving Xolair every 4 weeks due to recent hive flare ups

## 2024-11-05 ENCOUNTER — NURSE ONLY (OUTPATIENT)
Dept: ALLERGY | Facility: CLINIC | Age: 54
End: 2024-11-05
Payer: COMMERCIAL

## 2024-11-05 VITALS
HEART RATE: 81 BPM | SYSTOLIC BLOOD PRESSURE: 142 MMHG | OXYGEN SATURATION: 100 % | RESPIRATION RATE: 16 BRPM | DIASTOLIC BLOOD PRESSURE: 87 MMHG

## 2024-11-05 DIAGNOSIS — L50.1 CHRONIC IDIOPATHIC URTICARIA: Primary | ICD-10-CM

## 2024-11-05 PROCEDURE — 3077F SYST BP >= 140 MM HG: CPT | Performed by: ALLERGY & IMMUNOLOGY

## 2024-11-05 PROCEDURE — 3079F DIAST BP 80-89 MM HG: CPT | Performed by: ALLERGY & IMMUNOLOGY

## 2024-11-05 PROCEDURE — 96372 THER/PROPH/DIAG INJ SC/IM: CPT | Performed by: ALLERGY & IMMUNOLOGY

## 2024-11-05 NOTE — PROGRESS NOTES
Xolair Progress Note:     See \"vitals\" flow sheet for vital sign detail.   See MAR for injection detail.     Per standing order pt to continue Xolair injections every 4  weeks 300 mg.   PT verified name, , and injection to be given. Xolair 300 mg SC given upper left arms at 1052.    Xolair Exp: 2025  Xolair Lot #[id number]    Patient Status: Patient d/c'd home 30 minutes s/p Xolair injections for idiopathic urticaria. Patient  tolerates injection without complications. Site WNL. No further questions or concerns at this time.  Return to clinic for next injection as advised by Dr. Thomas.     Time d/c to home 1123  Next appointment scheduled for: 2024

## 2024-11-11 ENCOUNTER — TELEPHONE (OUTPATIENT)
Dept: ALLERGY | Facility: CLINIC | Age: 54
End: 2024-11-11

## 2024-11-11 NOTE — TELEPHONE ENCOUNTER
Refill requested for Xolair 300 mg/2 ml syringe     Last office visit: 10/09/2024    Previously advised to follow up in 3 months     F/U currently scheduled? Yes 1/14/2025        ACTION: Refilled per protocol to requested pharmacy-- [facility].

## 2024-11-19 RX ORDER — OMALIZUMAB 300 MG/2ML
300 INJECTION, SOLUTION SUBCUTANEOUS
Qty: 2 ML | Refills: 2 | Status: SHIPPED | OUTPATIENT
Start: 2024-11-19 | End: 2024-11-21

## 2024-11-19 NOTE — TELEPHONE ENCOUNTER
Refill requested for      Name from pharmacy: XOLAIR 300 MG/2ML SOLUTION PREFILLED SYRINGE         Will file in chart as: XOLAIR 300 MG/2ML Subcutaneous Solution Prefilled Syringe     Possible duplicate: Kaleb to review recent actions on this medication    Sig: INJECT 300 MG UNDER THE SKIN (SUBCUTANEOUS INJECTION) EVERY 28 DAYS.    Disp: 2 each    Refills: 0    Start: 11/19/2024    Class: Normal    Non-formulary    Last ordered: 1 week ago (11/11/2024) by Polo Thomas MD    Last refill: 10/30/2024    Rx #: [id number]    Biologic Medications Passed 11/19/2024 02:14 AM   Protocol Details Appt in past 6 mos or next 3 mos      To be filled at: [facility] - [place], [place] - [address] [facility] [phone], [phone]               Last office visit: 10/09/2024    Previously advised to follow up in 3 months     F/U currently scheduled? Yes, 1/14/2025      ACTION: Refilled per protocol.

## 2024-11-22 RX ORDER — OMALIZUMAB 300 MG/2ML
300 INJECTION, SOLUTION SUBCUTANEOUS
Qty: 2 ML | Refills: 2 | Status: SHIPPED | OUTPATIENT
Start: 2024-11-22

## 2024-11-22 NOTE — TELEPHONE ENCOUNTER
Requested Prescriptions   Pending Prescriptions Disp Refills    omalizumab (XOLAIR) 300 mg/2mL Subcutaneous Solution Prefilled Syringe 2 mL 2     Sig: Inject 300 mg into the skin every 28 days.       Biologic Medications Passed - 11/22/2024 10:01 AM        Passed - Appt in past 6 mos or next 3 mos     Recent Outpatient Visits              2 weeks ago Chronic idiopathic urticaria [L50.1]    [facility]    Nurse Only    1 month ago Chronic idiopathic urticaria [L50.1]    [facility]    [facility]icine    1 month ago Chronic idiopathic urticaria [L50.1]    [facility]    Nurse Only    2 months ago Chronic idiopathic urticaria    [facility]    Nurse Only    4 months ago Chronic idiopathic urticaria    [facility]    Nurse Only          Future Appointments         Provider Department Appt Notes    In 1 week Ghassan Michel MD [address] Blood tests plus referral for dermatologist (DARINEL galeano) ([place])    In 2 weeks [facility] X[facility]air    In 1 month Polo Thomas MD [facility] CIU follow-up                       Refilled per med refill protocol.

## 2024-11-26 ENCOUNTER — TELEPHONE (OUTPATIENT)
Dept: ALLERGY | Facility: CLINIC | Age: 54
End: 2024-11-26

## 2024-11-26 NOTE — TELEPHONE ENCOUNTER
RN called to [facility]   Spoke to Kimber   Scheduled delivery for 12/3/24 Xolair 300 mg/2 mL     Next appointment 12/9/24

## 2024-12-04 ENCOUNTER — TELEPHONE (OUTPATIENT)
Dept: FAMILY MEDICINE CLINIC | Facility: CLINIC | Age: 54
End: 2024-12-04

## 2024-12-09 ENCOUNTER — NURSE ONLY (OUTPATIENT)
Dept: ALLERGY | Facility: CLINIC | Age: 54
End: 2024-12-09
Payer: COMMERCIAL

## 2024-12-09 VITALS — DIASTOLIC BLOOD PRESSURE: 87 MMHG | SYSTOLIC BLOOD PRESSURE: 132 MMHG | OXYGEN SATURATION: 100 % | HEART RATE: 72 BPM

## 2024-12-09 DIAGNOSIS — L50.1 CHRONIC IDIOPATHIC URTICARIA: Primary | ICD-10-CM

## 2024-12-09 PROCEDURE — 96372 THER/PROPH/DIAG INJ SC/IM: CPT | Performed by: ALLERGY & IMMUNOLOGY

## 2024-12-09 PROCEDURE — 3079F DIAST BP 80-89 MM HG: CPT | Performed by: ALLERGY & IMMUNOLOGY

## 2024-12-09 PROCEDURE — 3075F SYST BP GE 130 - 139MM HG: CPT | Performed by: ALLERGY & IMMUNOLOGY

## 2024-12-09 RX ORDER — CETIRIZINE HYDROCHLORIDE 10 MG/1
10 TABLET ORAL DAILY
COMMUNITY

## 2024-12-09 NOTE — PROGRESS NOTES
Xolair Progress Note:     See \"vitals\" flow sheet for vital sign detail.   See MAR for injection detail.     Per standing order pt to continue Xolair injections every 4  weeks 300 mg.   PT verified name, , and injection to be given. Xolair 150 mg SC given upper left arm at 1053.    Xolair Exp: 2025  Xolair Lot # [id number]    Patient Status: Patient d/c'd home 30 minutes s/p Xolair injections for idiopathic urticaria.  Patient  tolerates injection without complications. Site WNL. No further questions or concerns at this time.  Return to clinic for next injection as advised by Dr. Thomas.     Time d/c to home 1123  Next appointment scheduled for: 2025

## 2024-12-23 ENCOUNTER — TELEPHONE (OUTPATIENT)
Dept: ALLERGY | Facility: CLINIC | Age: 54
End: 2024-12-23

## 2024-12-23 NOTE — TELEPHONE ENCOUNTER
The pharmacy wants to schedule a delivery of:           omalizumab (XOLAIR) 300 mg/2mL Subcutaneous Solution Prefilled Syringe 2 mL 2 11/22/2024 --   Sig:   Inject 300 mg into the skin every 28 days.     Route:   Subcutaneous     Order #:   [id number]

## 2024-12-23 NOTE — TELEPHONE ENCOUNTER
Tatianna from [facility] pharmacy calling   Attempted to schedule Xolair delivery  Unable to schedule delivery until next year  Patient's next appointment will be on 1/14/25  Staff to reach out to [facility] pharmacy to schedule   Will wait till next year so pharmacy can run insurance for the new year  Staff to call in the beginning of January 2025 to schedule Xolair delivery

## 2024-12-23 NOTE — TELEPHONE ENCOUNTER
Next injection scheduled for 1/14/2025.     Specialty pharmacy needs an updated consent on file for patient. They will reach out to patient to get that consent. They will call back when they have obtained it.

## 2025-01-14 ENCOUNTER — TELEPHONE (OUTPATIENT)
Dept: ALLERGY | Facility: CLINIC | Age: 55
End: 2025-01-14

## 2025-01-14 ENCOUNTER — NURSE ONLY (OUTPATIENT)
Dept: ALLERGY | Facility: CLINIC | Age: 55
End: 2025-01-14

## 2025-01-14 ENCOUNTER — OFFICE VISIT (OUTPATIENT)
Dept: ALLERGY | Facility: CLINIC | Age: 55
End: 2025-01-14
Payer: COMMERCIAL

## 2025-01-14 VITALS
RESPIRATION RATE: 18 BRPM | OXYGEN SATURATION: 97 % | HEART RATE: 71 BPM | DIASTOLIC BLOOD PRESSURE: 84 MMHG | SYSTOLIC BLOOD PRESSURE: 136 MMHG

## 2025-01-14 DIAGNOSIS — L50.1 CHRONIC IDIOPATHIC URTICARIA: Primary | ICD-10-CM

## 2025-01-14 DIAGNOSIS — Z23 FLU VACCINE NEED: ICD-10-CM

## 2025-01-14 DIAGNOSIS — Z23 NEED FOR COVID-19 VACCINE: ICD-10-CM

## 2025-01-14 DIAGNOSIS — J30.2 SEASONAL AND PERENNIAL ALLERGIC RHINOCONJUNCTIVITIS: ICD-10-CM

## 2025-01-14 DIAGNOSIS — H10.10 SEASONAL AND PERENNIAL ALLERGIC RHINOCONJUNCTIVITIS: ICD-10-CM

## 2025-01-14 DIAGNOSIS — Z79.899 VISIT FOR MONITORING XOLAIR THERAPY: ICD-10-CM

## 2025-01-14 DIAGNOSIS — Z51.81 VISIT FOR MONITORING XOLAIR THERAPY: ICD-10-CM

## 2025-01-14 DIAGNOSIS — J30.89 SEASONAL AND PERENNIAL ALLERGIC RHINOCONJUNCTIVITIS: ICD-10-CM

## 2025-01-14 PROCEDURE — 3075F SYST BP GE 130 - 139MM HG: CPT | Performed by: ALLERGY & IMMUNOLOGY

## 2025-01-14 PROCEDURE — 99214 OFFICE O/P EST MOD 30 MIN: CPT | Performed by: ALLERGY & IMMUNOLOGY

## 2025-01-14 PROCEDURE — 3079F DIAST BP 80-89 MM HG: CPT | Performed by: ALLERGY & IMMUNOLOGY

## 2025-01-14 PROCEDURE — 96372 THER/PROPH/DIAG INJ SC/IM: CPT | Performed by: ALLERGY & IMMUNOLOGY

## 2025-01-14 RX ORDER — MULTIVITAMIN WITH IRON
50 TABLET ORAL DAILY
COMMUNITY

## 2025-01-14 RX ORDER — MULTIVIT-MIN/IRON/FOLIC ACID/K 18-600-40
CAPSULE ORAL
COMMUNITY

## 2025-01-14 NOTE — TELEPHONE ENCOUNTER
While in office today for follow up and Xolair injections, pt needed to schedule future Xolair injection appointments.    RN spoke with Dr. Thomas. He advises that it would be best to receive Xolair every 4 weeks for symptom control and prevention. However, pt prefers to receive it every 5 weeks instead.  RN notified pt of Dr. Thomas's recommendations via [employer] message.    Provided call back number and office hours.

## 2025-01-14 NOTE — PROGRESS NOTES
Xolair Progress Note:     Pre-treatment Peak Flow: NA  Post treatment Peak Flow: NA  See \"vitals\" flow sheet for vital sign detail.   See MAR for injection detail.     Per standing order pt to continue Xolair injections every 4  weeks 300 mg.   PT verified name, , and injection to be given. Xolair 300 mg SC given upper left arm at 1141.    LOT#: [id number]  EXP: 2025     Patient Status: Patient d/c'd home 30 minutes s/p Xolair injections for idiopathic urticaria/. Patient  tolerates injection without complications. Site WNL. No further questions or concerns at this time.  Return to clinic for next injection as advised by Dr. Thomas.     Time d/c to home 1210  Next appointment scheduled for: 2025  Pt prefers to stretch out to 5 weeks despite recommendations to receive Xolair every 4 weeks.

## 2025-01-14 NOTE — PROGRESS NOTES
Luzmaria Stacy is a 54 year old female.    HPI:   No chief complaint on file.    Patient is a 54-year-old female who presents for follow-up with a chief complaint of hives  Patient last seen by me in 2024  History of chronic idiopathic urticaria and allergic rhinitis  Medication list includes Zyrtec Xolair Flonase EpiPen    Immunizations reviewed.  None on record    Today patient reports    CIU  Started seeing me in 2023   Active or persistent symptoms: denies   ED visits or prednisone in the interim:  denies   Active meds: Xolair, Zyrtec, xyzal  epipen   Patient denies adverse events with Xolair.  Xolair in office   Tried off xolair twice and hives returned   Last xolair was 24  On xolair x 3 yrs prior to me    Used to allergist in [place]    Xolair today   Hive free when on xolair        Ar:  Active or persistent symptoms: some runny nose, sz   Active meds: zyrtec   Pets none     No rash         HISTORY:  Past Medical History:    Allergic rhinitis    Ragweed    Arthritis    Mild    Essential hypertension    Visual impairment      Past Surgical History:   Procedure Laterality Date    Colonoscopy N/A 3/7/2024    Procedure: COLONOSCOPY with forcep polypectomy;  Surgeon: Horacio Bingham DO;  Location:  ENDOSCOPY    Dilation/curettage,diagnostic        91,96,99    Tubal ligation  Tubes caderized in       Family History   Problem Relation Age of Onset    Cancer Mother     Hypertension Mother     Breast Cancer Mother     Asthma Father     Skin cancer Sister       Social History:   Social History     Socioeconomic History    Marital status: Single   Tobacco Use    Smoking status: Never     Passive exposure: Never    Smokeless tobacco: Never   Vaping Use    Vaping status: Never Used   Substance and Sexual Activity    Alcohol use: Yes     Comment: On average a few drinks a week    Drug use: Not Currently     Types: Cannabis   Other Topics Concern    Caffeine Concern No    Exercise Yes    Seat  Belt Yes    Special Diet No    Stress Concern No    Weight Concern No        Medications (Active prior to today's visit):  Current Outpatient Medications   Medication Sig Dispense Refill    cetirizine 10 MG Oral Tab Take 1 tablet (10 mg total) by mouth daily.      omalizumab (XOLAIR) 300 mg/2mL Subcutaneous Solution Prefilled Syringe Inject 300 mg into the skin every 28 days. 2 mL 2    fluticasone propionate 50 MCG/ACT Nasal Suspension by Each Nare route daily.      Ascorbic Acid (VITAMIN C OR) Take by mouth.      COLLAGEN OR Take by mouth.      Probiotic Product (PROBIOTIC OR) Take by mouth.      EPINEPHrine (EPIPEN 2-CON) 0.3 MG/0.3ML Injection Solution Auto-injector Inject IM in event of  allergic reaction (Patient not taking: Reported on 12/9/2024) 1 each 0    diphenhydrAMINE HCl (BENADRYL ALLERGY OR) Take 2 tablets by mouth daily.         Allergies:  Allergies[1]      ROS:   Allergic/Immuno:  See hpi  Cardiovascular:  Negative for irregular heartbeat/palpitations, chest pain, edema  Constitutional:  Negative night sweats,weight loss, irritability and lethargy  ENMT:  Negative for ear drainage, hearing loss and nasal drainage  Eyes:  Negative for eye discharge and vision loss  Gastrointestinal:  Negative for abdominal pain, diarrhea and vomiting  Integumentary:  Negative for pruritus and rash  Respiratory:  Negative for cough, dyspnea and wheezing    PHYSICAL EXAM:   Constitutional: responsive, no acute distress noted  Head/Face: NC/Atraumatic  Eyes/Vision: conjunctiva and lids are normal extraocular motion is intact   Ears/Audiometry: tympanic membranes are normal bilaterally hearing is grossly intact  Nose/Mouth/Throat: nose and throat are clear mucous membranes are moist   Neck/Thyroid: neck is supple without adenopathy  Lymphatic: no abnormal cervical, supraclavicular or axillary adenopathy is noted  Respiratory: normal to inspection lungs are clear to auscultation bilaterally normal respiratory effort    Cardiovascular: regular rate and rhythm no murmurs, gallups, or rubs  Abdomen: soft non-tender non-distended  Skin/Hair: no unusual rashes present   Extremities: no edema, cyanosis, or clubbing     ASSESSMENT/PLAN:   Assessment   Encounter Diagnoses   Name Primary?    Chronic idiopathic urticaria Yes    Visit for monitoring Xolair therapy     Seasonal and perennial allergic rhinoconjunctivitis     Flu vaccine need     Need for COVID-19 vaccine        #1 chronic idiopathic urticaria  No rash in office today.  Patient reports a good response to Xolair when she is on it.  Some issues with compliance with Xolair in the past due to insurance issues.  Xolair in office today followed by 30 minutes of observation without issue or incident  Continue with Zyrtec/cetirizine 10 mg once a day up to 4 times per day if needed    2.  Visit for monitoring Xolair therapy  Denies side effects with Xolair today.    3.  Allergic rhinitis  Continue with Zyrtec  May add Astelin 2 sprays per nostril twice a day as an antihistamine nasal spray if needed  Reviewed avoidance measures and potential treatment option immunotherapy    4.  Flu vaccine recommended and offered    5.  COVID-vaccine booster recommended.  Please check with local pharmacy as we do not stock the vaccine      Follow-up in 6 to 12 months           Orders This Visit:  No orders of the defined types were placed in this encounter.      Meds This Visit:  Requested Prescriptions      No prescriptions requested or ordered in this encounter       Imaging & Referrals:  None     1/14/2025  Polo Thomas MD    If medication samples were provided today, they were provided solely for patient education and training related to self administration of these medications.  Teaching, instruction and sample was provided to the patient by myself.  Teaching included  a review of potential adverse side effects as well as potential efficacy.  Patient's questions were answered in regards to  medication administration and dosing and potential side effects. Teaching was provided via the teach back method           [1]   Allergies  Allergen Reactions    Ragweed HIVES

## 2025-01-14 NOTE — TELEPHONE ENCOUNTER
Patient has remaining Xolair dose remaining in Allergy Office.  No Xolair delivered needed for 1/2025.

## 2025-01-14 NOTE — TELEPHONE ENCOUNTER
Xolair Referral/prior authorization request entered in Epic.     Await response from [facility].

## 2025-01-14 NOTE — PATIENT INSTRUCTIONS
#1 chronic idiopathic urticaria  No rash in office today.  Patient reports a good response to Xolair when she is on it.  Some issues with compliance with Xolair in the past due to insurance issues.  Xolair in office today followed by 30 minutes of observation without issue or incident  Continue with Zyrtec/cetirizine 10 mg once a day up to 4 times per day if needed    2.  Visit for monitoring Xolair therapy  Denies side effects with Xolair today.    3.  Allergic rhinitis  Continue with Zyrtec  May add Astelin 2 sprays per nostril twice a day as an antihistamine nasal spray if needed  Reviewed avoidance measures and potential treatment option immunotherapy    4.  Flu vaccine recommended and offered    5.  COVID-vaccine booster recommended.  Please check with local pharmacy as we do not stock the vaccine      Follow-up in 6 to 12 months

## 2025-01-24 ENCOUNTER — TELEPHONE (OUTPATIENT)
Dept: ADMINISTRATIVE | Age: 55
End: 2025-01-24

## 2025-01-25 NOTE — TELEPHONE ENCOUNTER
Hello     Referral# [id number] is approved and is still an active authorization.            Referral# [id number]  was just placed for the same medication as above.    This referral will be closed.  A new referral if needed, can be placed closer to the current active authorizations, expiration date of 9/5/25 or, when all 12 approved visits / units have been used.     Thank you  Andria

## 2025-02-03 NOTE — TELEPHONE ENCOUNTER
[facility] Physician [facility] contacted at [phone].  RN spoke with Sonja.     Sonja offers that referral request for Xolair was not received.     Regina Jerome with referral department [facility] contacted at [phone].     Regina offers that referral was not received.  She will check on status of current Xolair referral/prior authorization dated 9/5/2024-9/5/2025.    Regina offers that all [facility] patients, previously P covered patients, will have there current referral honored until the expiration date.     Regina offers that she will reach out to [facility] to be sure the above is still the case.

## 2025-02-04 NOTE — TELEPHONE ENCOUNTER
[facility] contacted at [phone].  RN spoke with pharmacy , Deedee.     RN attempted to schedule delivery of Xolair.     Representative reports that patient's Prior Authorization is in process of Major Medical Verification.

## 2025-02-11 NOTE — TELEPHONE ENCOUNTER
[facility] contacted at [phone].  RN spoke with pharmacy , Suzi.     RN attempted to schedule delivery of Xolair.      Representative reports that patient has a antolin deductible and is in need of copayment assistance.      Rep offers that [facility] reached out to patient with information regarding copayment assistance.     9/2023 Xolair Access [employer] Copayment Assistance Information reported as it may be active.     Xolair Copayment Program  Member ID: [id number]  Group: [id number]  Bin: [id number]  PCN: [id number]    Copayment Assistance Telephone: [phone]      Rep states that above copayment assistance will be run through insurance.    Xolair copayment assistance telephone number above contacted.  RN spoke with representative, MIAH.     Representative reports that above copayment assistance is active.     See Chart[employer] message sent to patient.       Alec Dutta,     I spoke with [facility] earlier today, 2/11/2025.      I attempted to schedule delivery of Xolair to the Allergy Office; however, I was informed that you had a copayment that needed to be paid prior to the Xolair being scheduled for delivery.      I reached out to [employer] Copayment Assistance Program and they reported that you have an active copayment assistance account.  The below information was reported to [facility].      Xolair Copayment Program  Member ID: [id number]  Group: [id number]  Bin: [id number]  PCN: [id number]     Copayment Assistance Telephone: [phone]     [facility] will run the assistance through insurance.     I will attempt to schedule delivery of Xolair to the Allergy Office later this week once the copayment assistance is verified by [facility].      Regards,     Yanira BROWN RN

## 2025-02-13 NOTE — TELEPHONE ENCOUNTER
[facility] contacted at [phone].     RN spoke with pharmacy , Yanira.     Xolair 150 mg/mL prefilled syringe x2 scheduled for delivery: 2/18/2025    Patient's next Xolair injection appointment: 2/18/2025.

## 2025-02-18 ENCOUNTER — TELEPHONE (OUTPATIENT)
Dept: ALLERGY | Facility: CLINIC | Age: 55
End: 2025-02-18

## 2025-02-18 ENCOUNTER — NURSE ONLY (OUTPATIENT)
Dept: ALLERGY | Facility: CLINIC | Age: 55
End: 2025-02-18
Payer: COMMERCIAL

## 2025-02-18 VITALS
DIASTOLIC BLOOD PRESSURE: 89 MMHG | HEART RATE: 80 BPM | RESPIRATION RATE: 99 BRPM | OXYGEN SATURATION: 95 % | SYSTOLIC BLOOD PRESSURE: 151 MMHG

## 2025-02-18 DIAGNOSIS — L50.1 CHRONIC IDIOPATHIC URTICARIA: Primary | ICD-10-CM

## 2025-02-18 PROCEDURE — 3079F DIAST BP 80-89 MM HG: CPT | Performed by: ALLERGY & IMMUNOLOGY

## 2025-02-18 PROCEDURE — 3077F SYST BP >= 140 MM HG: CPT | Performed by: ALLERGY & IMMUNOLOGY

## 2025-02-18 PROCEDURE — 96372 THER/PROPH/DIAG INJ SC/IM: CPT | Performed by: ALLERGY & IMMUNOLOGY

## 2025-02-18 NOTE — PROGRESS NOTES
Xolair Progress Note:     See \"vitals\" flow sheet for vital sign detail.   See MAR for injection detail.     Per standing order pt to continue Xolair injections every 4  weeks 300 mg.   PT verified name, , and injection to be given. Xolair 150 mg SC given upper left arms at 1040.    Sample used. Education deferred.   Xolair Exp:2025  Xolair Lot #[id number]    Patient Status: Patient d/c'd home 30 minutes s/p Xolair injections for idiopathic urticaria. Patient  tolerates injection without complications. Site WNL. No further questions or concerns at this time.  Return to clinic for next injection as advised by Dr. Thomas.     Time d/c to home 1110  Next appointment scheduled for: 2025

## 2025-02-18 NOTE — TELEPHONE ENCOUNTER
Received Xolair shipment 300 mg/2 mL x1  Placed medication in fridge  Charted in biologic tracking chart

## 2025-02-18 NOTE — TELEPHONE ENCOUNTER
Dr. Thomas may we have orders for Luzmaria's Xolair?    Xolair 300 mg PFS every 28 days.     Thank you.

## 2025-03-18 ENCOUNTER — TELEPHONE (OUTPATIENT)
Dept: ALLERGY | Facility: CLINIC | Age: 55
End: 2025-03-18

## 2025-03-18 NOTE — TELEPHONE ENCOUNTER
[facility] contacted at [phone].     Xolair 150 mg/mL prefilled syringe x2 scheduled for delivery to physician's office on 3/20/2025.     Patient's next Xolair injection appointment: 4/1/2025

## 2025-03-27 ENCOUNTER — TELEPHONE (OUTPATIENT)
Dept: ALLERGY | Facility: CLINIC | Age: 55
End: 2025-03-27

## 2025-03-27 NOTE — TELEPHONE ENCOUNTER
[facility] calling to schedule refill to be delivered to the office/Dr. Thomas    omalizumab (XOLAIR) 300 mg/2mL Subcutaneous Solution Prefilled Syringe     Pharmacy: [facility] - [place], [place] - [address] [facility] [phone], [phone]

## 2025-04-01 ENCOUNTER — NURSE ONLY (OUTPATIENT)
Dept: ALLERGY | Facility: CLINIC | Age: 55
End: 2025-04-01
Payer: COMMERCIAL

## 2025-04-01 ENCOUNTER — TELEPHONE (OUTPATIENT)
Dept: ALLERGY | Facility: CLINIC | Age: 55
End: 2025-04-01

## 2025-04-01 VITALS — DIASTOLIC BLOOD PRESSURE: 85 MMHG | SYSTOLIC BLOOD PRESSURE: 132 MMHG | OXYGEN SATURATION: 100 % | HEART RATE: 72 BPM

## 2025-04-01 DIAGNOSIS — L50.1 CHRONIC IDIOPATHIC URTICARIA: Primary | ICD-10-CM

## 2025-04-01 DIAGNOSIS — L50.1 IDIOPATHIC URTICARIA: Primary | ICD-10-CM

## 2025-04-01 PROCEDURE — 96372 THER/PROPH/DIAG INJ SC/IM: CPT | Performed by: ALLERGY & IMMUNOLOGY

## 2025-04-01 PROCEDURE — 3075F SYST BP GE 130 - 139MM HG: CPT | Performed by: ALLERGY & IMMUNOLOGY

## 2025-04-01 PROCEDURE — 3079F DIAST BP 80-89 MM HG: CPT | Performed by: ALLERGY & IMMUNOLOGY

## 2025-04-01 RX ORDER — CLINDAMYCIN HYDROCHLORIDE 150 MG/1
150 CAPSULE ORAL 4 TIMES DAILY
COMMUNITY
Start: 2024-10-22

## 2025-04-01 RX ORDER — OMALIZUMAB 150 MG/ML
INJECTION, SOLUTION SUBCUTANEOUS
COMMUNITY
Start: 2025-03-19

## 2025-04-01 NOTE — TELEPHONE ENCOUNTER
Dr. Thomas, Patient is receiving Xolair 300 mg every 4 weeks- 1 syringe. May we have new orders placed, thanks.

## 2025-04-01 NOTE — TELEPHONE ENCOUNTER
Spoke with Monique at [facility] regarding delivery of Xolair for patient. Per Monique Xolair will be delivered 4/8/25.

## 2025-04-01 NOTE — PROGRESS NOTES
Patient presented to the office for Xolair injection for chronic hives.    See vital sign template for vital signes.    Reviewed with patient allergies, histories and medications.    Xolair 300mg -1 syringe given at 10:50 am and patient was observed in the office for 30 minutes and was released from the office at 11:30 am after being assessed. Patient discharged from the office without complications.    Xolair - NDC 64491-715-15              LOT [id number]              EXP.09/2025   Addend- Xolair was patient supplied.

## 2025-04-15 RX ORDER — OMALIZUMAB 300 MG/2ML
300 INJECTION, SOLUTION SUBCUTANEOUS
Qty: 2 ML | Refills: 2 | Status: SHIPPED | OUTPATIENT
Start: 2025-04-15

## 2025-04-15 NOTE — TELEPHONE ENCOUNTER
Fax received from [facility] asking for refill of Xolair.     Patient last seen in Allergy for physician visit on 1/14/2025 for . . .       Chronic idiopathic urticaria Yes     Visit for monitoring Xolair therapy      Seasonal and perennial allergic rhinoconjunctivitis      Flu vaccine need      Need for COVID-19 vaccine      Requested Prescriptions   Pending Prescriptions Disp Refills    omalizumab (XOLAIR) 300 mg/2mL Subcutaneous Solution Prefilled Syringe 2 mL 2     Sig: Inject 300 mg into the skin every 28 days.       Biologic Medications Passed - 4/15/2025  5:42 PM        Passed - Appt in past 6 mos or next 3 mos     Recent Outpatient Visits              2 weeks ago Idiopathic urticaria    [facility]    Nurse Only    1 month ago Chronic idiopathic urticaria [L50.1]    [facility]    Nurse Only    3 months ago Chronic idiopathic urticaria    [facility]    Nurse Only    3 months ago Chronic idiopathic urticaria    [facility] Polo Thomas MD    Office Visit    4 months ago Chronic idiopathic urticaria    [facility]    Nurse Only          Future Appointments         Provider Department Appt Notes    In 4 weeks EC ALLERGY [facility] Xolair                    Passed - Medication is active on med list           omalizumab (XOLAIR) 300 mg/2mL Subcutaneous Solution Prefilled Syringe 2 mL 2 4/15/2025 --    Sig - Route: Inject 300 mg into the skin every 28 days. - Subcutaneous    Sent to pharmacy as: Xolair 300 MG/2ML Subcutaneous Solution Prefilled Syringe (omalizumab)    E-Prescribing Status: Receipt confirmed by pharmacy (4/15/2025  5:43 PM CDT)      Pharmacy    [id number] - [place] [phone],  [phone]     Prescription as above sent to pharmacy per protocol.

## 2025-04-16 ENCOUNTER — TELEPHONE (OUTPATIENT)
Dept: ALLERGY | Facility: CLINIC | Age: 55
End: 2025-04-16

## 2025-04-21 NOTE — TELEPHONE ENCOUNTER
[facility] contacted at [phone].     Inquired of representative if prior authorization is needed for Xolair as physician's office received a prior authorization request for Xolair.     Representative reports that patient's last insurance verification confirmed coverage under medical policy.  No prior authorization for Xolair needed.

## 2025-04-28 RX ORDER — OMALIZUMAB 300 MG/2ML
300 INJECTION, SOLUTION SUBCUTANEOUS
Qty: 2 EACH | Refills: 0 | Status: SHIPPED | OUTPATIENT
Start: 2025-04-28

## 2025-04-28 NOTE — TELEPHONE ENCOUNTER
Refill requested for:   Name from pharmacy: XOLAIR 300 MG/2ML SOLUTION PREFILLED SYRINGE         Will file in chart as: XOLAIR 300 MG/2ML Subcutaneous Solution Prefilled Syringe     Possible duplicate: Kaleb to review recent actions on this medication    Sig: Inject 300 mg into the skin every 28 days.    Disp: 2 each    Refills: 0    Start: 4/27/2025    Class: Normal    Non-formulary    Last ordered: 1 week ago (4/15/2025) by Polo Thomas MD    Last refill: 4/3/2025    Rx #: [id number]    Biologic Medications Passed 04/27/2025 02:14 AM   Protocol Details Appt in past 6 mos or next 3 mos    Medication is active on med list      To be filled at: [facility] [address] [facility] [phone], [phone]       Last office visit: 1/14/2025    Previously advised to follow up in: 6 to 12 months    F/U currently scheduled? No---just a Xolair nurse visit for 5/13/2025      Date of last refill: 4/15/2025    ACTION: Refilled per protocol.

## 2025-05-01 ENCOUNTER — TELEPHONE (OUTPATIENT)
Dept: ALLERGY | Facility: CLINIC | Age: 55
End: 2025-05-01

## 2025-05-01 NOTE — TELEPHONE ENCOUNTER
[facility] contacted at [phone].     Xolair 300 mg/ 2 mL prefilled syringe x1 scheduled for delivery to physician's office on 5/6/2025.     Next scheduled Xolair injection appointment: 5/13/2025

## 2025-05-01 NOTE — TELEPHONE ENCOUNTER
Camille Wants to schedule delivery for Xolair for patient. 300mg and 2 millimeter syringe      Phone :[phone]

## 2025-05-13 ENCOUNTER — NURSE ONLY (OUTPATIENT)
Dept: ALLERGY | Facility: CLINIC | Age: 55
End: 2025-05-13
Payer: COMMERCIAL

## 2025-05-13 VITALS — OXYGEN SATURATION: 99 % | HEART RATE: 78 BPM | SYSTOLIC BLOOD PRESSURE: 128 MMHG | DIASTOLIC BLOOD PRESSURE: 84 MMHG

## 2025-05-13 DIAGNOSIS — L50.1 CHRONIC IDIOPATHIC URTICARIA: Primary | ICD-10-CM

## 2025-05-13 PROCEDURE — 96372 THER/PROPH/DIAG INJ SC/IM: CPT | Performed by: ALLERGY & IMMUNOLOGY

## 2025-05-13 PROCEDURE — 3074F SYST BP LT 130 MM HG: CPT | Performed by: ALLERGY & IMMUNOLOGY

## 2025-05-13 PROCEDURE — 3079F DIAST BP 80-89 MM HG: CPT | Performed by: ALLERGY & IMMUNOLOGY

## 2025-05-13 RX ORDER — OMALIZUMAB 300 MG/2ML
300 INJECTION, SOLUTION SUBCUTANEOUS
Qty: 2 EACH | Refills: 2 | Status: SHIPPED | OUTPATIENT
Start: 2025-05-13

## 2025-05-13 NOTE — TELEPHONE ENCOUNTER
Patient last seen in Allergy 1/14/2025 for . . .    Chronic idiopathic urticaria Yes     Visit for monitoring Xolair therapy      Seasonal and perennial allergic rhinoconjunctivitis      Flu vaccine need      Need for COVID-19 vaccine        Requested Prescriptions   Pending Prescriptions Disp Refills    omalizumab (XOLAIR) 300 mg/2mL Subcutaneous Solution Prefilled Syringe 2 each 0     Sig: Inject 300 mg into the skin every 28 days.       Biologic Medications Passed - 5/13/2025  9:03 AM        Passed - Appt in past 6 mos or next 3 mos     Recent Outpatient Visits              1 month ago Idiopathic urticaria    [facility] [place]    Nurse Only    2 months ago Chronic idiopathic urticaria [L50.1]    [facility] [place]    Nurse Only    3 months ago Chronic idiopathic urticaria    [place] [facility]    Nurse Only    3 months ago Chronic idiopathic urticaria    [facility] [place] Polo Thomas MD    Office Visit    5 months ago Chronic idiopathic urticaria    [facility] [place]    Nurse Only          Future Appointments         Provider Department Appt Notes    Today [facility] [place] Xolair                    Passed - Medication is active on med list            Disp Refills Start End    omalizumab (XOLAIR) 300 mg/2mL Subcutaneous Solution Prefilled Syringe 2 each 2 5/13/2025 --    Sig - Route: Inject 300 mg into the skin every 28 days. - Subcutaneous    Sent to pharmacy as: Xolair 300 MG/2ML Subcutaneous Solution Prefilled Syringe (omalizumab)    E-Prescribing Status: Receipt confirmed by pharmacy (5/13/2025  9:04 AM CDT)      Pharmacy    [facility] - [place], [place] - [address] [facility] [phone], [phone]     Prescription as above sent  to pharmacy per protocol.

## 2025-05-13 NOTE — PROGRESS NOTES
Patient presents to the office for Xolair for chronic hives.    See vital sign template for vital signs.  Reviewed allergies, histories and medications with patient.    Xolair 300 mg prefilled syringe was given left upper arm subcutaneously at 1040am.    Patient was observed in the office and assessed for 30 minutes.    Patient was reassessed and was released from the office without complications at 1125am.    Next Xolair appointment 6/24/25

## 2025-05-27 ENCOUNTER — TELEPHONE (OUTPATIENT)
Dept: ALLERGY | Facility: CLINIC | Age: 55
End: 2025-05-27

## 2025-05-27 RX ORDER — OMALIZUMAB 300 MG/2ML
300 INJECTION, SOLUTION SUBCUTANEOUS
Qty: 2 EACH | Refills: 2 | Status: SHIPPED | OUTPATIENT
Start: 2025-05-27

## 2025-05-27 NOTE — TELEPHONE ENCOUNTER
Patient next appt 6/24/2025  [facility].     Insurance verification is still in process. They will reach out to us again when it is ready to schedule.

## 2025-05-27 NOTE — TELEPHONE ENCOUNTER
[facility] called to schedule a delivery for below medication.    [phone] hrs 8am-8pm M-F  8am-5pm Saturday [place]     Medication Quantity Refills Start End   omalizumab (XOLAIR) 300 mg/2mL Subcutaneous Solution Prefilled Syringe 2 each 2 5/13/2025 --   Sig:   Inject 300 mg into the skin every 28 days.     Route:   Subcutaneous     Order #:   [id number]

## 2025-05-27 NOTE — TELEPHONE ENCOUNTER
Patient last seen in Allergy 1/14/2025 for . . .    Chronic idiopathic urticaria Yes     Visit for monitoring Xolair therapy      Seasonal and perennial allergic rhinoconjunctivitis      Flu vaccine need      Need for COVID-19 vaccine        Requested Prescriptions   Pending Prescriptions Disp Refills    XOLAIR 300 MG/2ML Subcutaneous Solution Prefilled Syringe [Pharmacy Med Name: XOLAIR 300 MG/2ML SOLUTION PREFILLED SYRINGE] 2 each 0     Sig: INJECT 1 SYRINGE (300 MG) UNDER THE SKIN (SUBCUTANEOUS INJECTION) EVERY 28 DAYS.       Biologic Medications Failed - 5/27/2025  8:50 AM        Failed - Medication is active on med list        Passed - Appt in past 6 mos or next 3 mos     Recent Outpatient Visits              2 weeks ago Chronic idiopathic urticaria    [facility]    Nurse Only    1 month ago Idiopathic urticaria    [facility]    Nurse Only    3 months ago Chronic idiopathic urticaria [L50.1]    [facility]    Nurse Only    4 months ago Chronic idiopathic urticaria    [facility]    Nurse Only    4 [facility]    [facility]          Future Appointments         Provider Department Appt Notes    In 4 weeks [facility] ALLER[facility] Xolair                       omalizumab (XOLAIR) 300 mg/2mL Subcutaneous Solution Prefilled Syringe 2 each 2 5/27/2025 --    Sig - Route: Inject 300 mg into the skin every 28 days. Please schedule a follow-up appointment with Dr. Thomas to receive further refills. - Subcutaneous    Sent to pharmacy as: Xolair 300 MG/2ML Subcutaneous Solution Prefilled Syringe (omalizumab)    E-Prescribing Status: Receipt confirmed by pharmacy (5/27/2025  8:53 AM CDT)       Pharmacy    [facility] [place] [address] [facility] [phone], [phone]     Prescription as above sent to pharmacy per protocol.

## 2025-05-30 NOTE — TELEPHONE ENCOUNTER
Dr. Thomas, please advise on refill. Last physician note states that patient is to follow-up in 6-12 months.     Patient last seen in Allergy 1/14/2025 for . . .    Chronic idiopathic urticaria Yes     Visit for monitoring Xolair therapy      Seasonal and perennial allergic rhinoconjunctivitis      Flu vaccine need      Need for COVID-19 vaccine      Requested Prescriptions   Pending Prescriptions Disp Refills    XOLAIR 150 MG/ML Subcutaneous Solution Prefilled Syringe [Pharmacy Med Name: XOLAIR 150 MG/ML SYRINGE] 2 mL 0     Sig: INJECT 300 MG UNDER THE SKIN EVERY 28 DAYS       Biologic Medications Failed - 5/30/2025 10:55 AM        Failed - Medication is active on med list        Passed - Appt in past 6 mos or next 3 mos     Recent Outpatient Visits              2 weeks ago Chronic idiopathic urticaria    [facility]    Nurse Only    1 month ago Idiopathic urticaria    [facility]    Nurse Only    3 months ago Chronic idiopathic urticaria [L50.1]    [facility]    Nurse Only    4 months ago Chronic idiopathic urticaria    [facility]    Nurse Only    4 months ago Chronic idiopathic urticaria    [facility]    Office Visit          Future Appointments         Provider Department Appt Notes    [facility] 3 weeks [facility]

## 2025-05-30 NOTE — TELEPHONE ENCOUNTER
Camille called again requesting to schedule the patient Xolair delivery for her upcoming appointment.

## 2025-05-31 RX ORDER — OMALIZUMAB 150 MG/ML
300 INJECTION, SOLUTION SUBCUTANEOUS
Qty: 2 ML | Refills: 0 | Status: SHIPPED | OUTPATIENT
Start: 2025-05-31

## 2025-06-06 ENCOUNTER — TELEPHONE (OUTPATIENT)
Dept: ALLERGY | Facility: CLINIC | Age: 55
End: 2025-06-06

## 2025-06-06 NOTE — TELEPHONE ENCOUNTER
[facility] contacted at [phone].     RN spoke with pharmacy , Landon.        Xolair 150 mg/mL prefilled syringe x2 scheduled for delivery to physician's office on 6/10/2025      Patient's next scheduled Xolair injection appointment: 6/24/2025

## 2025-06-24 ENCOUNTER — NURSE ONLY (OUTPATIENT)
Dept: ALLERGY | Facility: CLINIC | Age: 55
End: 2025-06-24
Payer: COMMERCIAL

## 2025-06-24 VITALS
DIASTOLIC BLOOD PRESSURE: 87 MMHG | RESPIRATION RATE: 18 BRPM | OXYGEN SATURATION: 98 % | HEART RATE: 77 BPM | SYSTOLIC BLOOD PRESSURE: 147 MMHG

## 2025-06-24 DIAGNOSIS — L50.1 CHRONIC IDIOPATHIC URTICARIA: Primary | ICD-10-CM

## 2025-06-24 PROCEDURE — 96372 THER/PROPH/DIAG INJ SC/IM: CPT | Performed by: ALLERGY & IMMUNOLOGY

## 2025-06-24 PROCEDURE — 3077F SYST BP >= 140 MM HG: CPT | Performed by: ALLERGY & IMMUNOLOGY

## 2025-06-24 PROCEDURE — 3079F DIAST BP 80-89 MM HG: CPT | Performed by: ALLERGY & IMMUNOLOGY

## 2025-06-24 NOTE — PROGRESS NOTES
Xolair Progress Note:     Pre-treatment Peak Flow: NA  Post treatment Peak Flow: NA  See \"vitals\" flow sheet for vital sign detail.   See MAR for injection detail.     Per standing order pt to continue Xolair injections every 4  weeks 300 mg.   PT verified name, , and injection to be given. Xolair 300mg SC given upper right at 1050.    LOT#: [id number]  EXP:      Patient Status: Patient d/c'd home 30 minutes s/p Xolair injections for idiopathic urticaria. Patient  tolerates injection without complications. Site WNL. No further questions or concerns at this time.  Return to clinic for next injection as advised by Dr. Thomas.     Time d/c to home 1120  Next appointment scheduled for: 2025

## 2025-07-08 ENCOUNTER — TELEPHONE (OUTPATIENT)
Dept: ALLERGY | Facility: CLINIC | Age: 55
End: 2025-07-08

## 2025-07-08 NOTE — TELEPHONE ENCOUNTER
Sameera called to schedule delivery on below medication.    [phone]    Medication Quantity Refills Start End   omalizumab (XOLAIR) 300 mg/2mL Subcutaneous Solution Prefilled Syringe 2 each 2 5/27/2025 --   Sig:   Inject 300 mg into the skin every 28 days. Please schedule a follow-up appointment with Dr. Thomas to receive further refills.     Route:   Subcutaneous     Order #:   [id number]

## 2025-07-10 NOTE — TELEPHONE ENCOUNTER
[facility] contacted at [phone].     RN spoke pharmacy .     Xolair 150 mg/mL prefilled syringe x2 scheduled for delivery to physician's office on 7/15/2025.

## 2025-07-15 NOTE — TELEPHONE ENCOUNTER
Received Xolair delivery today     Xolair 300 mg x1 prefilled syringe placed in fridge    Next injection appointment 7/29/25

## 2025-07-28 ENCOUNTER — TELEPHONE (OUTPATIENT)
Dept: ALLERGY | Facility: CLINIC | Age: 55
End: 2025-07-28

## 2025-07-29 ENCOUNTER — NURSE ONLY (OUTPATIENT)
Dept: ALLERGY | Facility: CLINIC | Age: 55
End: 2025-07-29
Payer: COMMERCIAL

## 2025-07-29 VITALS — OXYGEN SATURATION: 100 % | HEART RATE: 79 BPM | SYSTOLIC BLOOD PRESSURE: 154 MMHG | DIASTOLIC BLOOD PRESSURE: 92 MMHG

## 2025-07-29 DIAGNOSIS — L50.1 CHRONIC IDIOPATHIC URTICARIA: Primary | ICD-10-CM

## 2025-07-29 PROCEDURE — 3080F DIAST BP >= 90 MM HG: CPT | Performed by: ALLERGY & IMMUNOLOGY

## 2025-07-29 PROCEDURE — 96372 THER/PROPH/DIAG INJ SC/IM: CPT | Performed by: ALLERGY & IMMUNOLOGY

## 2025-07-29 PROCEDURE — 3077F SYST BP >= 140 MM HG: CPT | Performed by: ALLERGY & IMMUNOLOGY

## 2025-07-29 RX ORDER — FOLIC ACID 200 MCG/ML
DROPS ORAL
COMMUNITY

## 2025-07-29 RX ORDER — IODINE SOLUTION STRONG 5% (LUGOL'S) 5 %
4 SOLUTION ORAL
COMMUNITY

## 2025-08-12 ENCOUNTER — TELEPHONE (OUTPATIENT)
Dept: ALLERGY | Facility: CLINIC | Age: 55
End: 2025-08-12

## 2025-08-26 RX ORDER — OMALIZUMAB 300 MG/2ML
300 INJECTION, SOLUTION SUBCUTANEOUS
Qty: 2 EACH | Refills: 2 | Status: SHIPPED | OUTPATIENT
Start: 2025-08-26

## (undated) DEVICE — GIJAW SINGLE-USE BIOPSY FORCEPS WITH NEEDLE: Brand: GIJAW

## (undated) DEVICE — KIT VLV 5 PC AIR H2O SUCT BX ENDOGATOR CONN

## (undated) DEVICE — KIT CUSTOM ENDOPROCEDURE STERIS

## (undated) DEVICE — 10FT COMBINED O2 DELIVERY/CO2 MONITORING. FILTER WITH MICROSTREAM TYPE LUER: Brand: DUAL ADULT NASAL CANNULA

## (undated) DEVICE — 3M™ RED DOT™ MONITORING ELECTRODE WITH FOAM TAPE AND STICKY GEL, 50/BAG, 20/CASE, 72/PLT 2570: Brand: RED DOT™

## (undated) DEVICE — 1200CC GUARDIAN II: Brand: GUARDIAN